# Patient Record
Sex: FEMALE | Race: BLACK OR AFRICAN AMERICAN | Employment: FULL TIME | URBAN - METROPOLITAN AREA
[De-identification: names, ages, dates, MRNs, and addresses within clinical notes are randomized per-mention and may not be internally consistent; named-entity substitution may affect disease eponyms.]

---

## 2018-06-29 ENCOUNTER — HOSPITAL ENCOUNTER (EMERGENCY)
Age: 58
Discharge: HOME | End: 2018-06-29
Attending: EMERGENCY MEDICINE
Payer: SELF-PAY

## 2018-06-29 VITALS
BODY MASS INDEX: 28.4 KG/M2 | WEIGHT: 202.82 LBS | DIASTOLIC BLOOD PRESSURE: 87 MMHG | SYSTOLIC BLOOD PRESSURE: 118 MMHG | RESPIRATION RATE: 16 BRPM | HEIGHT: 71 IN | OXYGEN SATURATION: 100 % | HEART RATE: 80 BPM | TEMPERATURE: 97.4 F

## 2018-06-29 DIAGNOSIS — H10.12 ALLERGIC CONJUNCTIVITIS OF LEFT EYE: ICD-10-CM

## 2018-06-29 DIAGNOSIS — F41.1 ANXIETY STATE: ICD-10-CM

## 2018-06-29 DIAGNOSIS — H57.89 IRRITATION OF EYE: ICD-10-CM

## 2018-06-29 DIAGNOSIS — Z71.6 TOBACCO ABUSE COUNSELING: ICD-10-CM

## 2018-06-29 DIAGNOSIS — S76.919A MUSCLE STRAIN OF THIGH, UNSPECIFIED LATERALITY, INITIAL ENCOUNTER: Primary | ICD-10-CM

## 2018-06-29 PROCEDURE — 99282 EMERGENCY DEPT VISIT SF MDM: CPT

## 2018-06-29 RX ORDER — KETOTIFEN FUMARATE 0.35 MG/ML
1 SOLUTION/ DROPS OPHTHALMIC 2 TIMES DAILY
Qty: 5 ML | Refills: 0 | Status: SHIPPED | OUTPATIENT
Start: 2018-06-29 | End: 2018-07-09

## 2018-06-29 RX ORDER — ERYTHROMYCIN 5 MG/G
OINTMENT OPHTHALMIC
Qty: 1 G | Refills: 0 | Status: SHIPPED | OUTPATIENT
Start: 2018-06-29 | End: 2018-07-06

## 2018-06-29 RX ORDER — METHOCARBAMOL 500 MG/1
500 TABLET, FILM COATED ORAL 4 TIMES DAILY
Qty: 20 TAB | Refills: 0 | Status: SHIPPED | OUTPATIENT
Start: 2018-06-29 | End: 2018-09-10

## 2018-06-29 NOTE — DISCHARGE INSTRUCTIONS
Pinkeye: Care Instructions  Your Care Instructions    Pinkeye is redness and swelling of the eye surface and the conjunctiva (the lining of the eyelid and the covering of the white part of the eye). Pinkeye is also called conjunctivitis. Pinkeye is often caused by infection with bacteria or a virus. Dry air, allergies, smoke, and chemicals are other common causes. Pinkeye often clears on its own in 7 to 10 days. Antibiotics only help if the pinkeye is caused by bacteria. Pinkeye caused by infection spreads easily. If an allergy or chemical is causing pinkeye, it will not go away unless you can avoid whatever is causing it. Follow-up care is a key part of your treatment and safety. Be sure to make and go to all appointments, and call your doctor if you are having problems. It's also a good idea to know your test results and keep a list of the medicines you take. How can you care for yourself at home? · Wash your hands often. Always wash them before and after you treat pinkeye or touch your eyes or face. · Use moist cotton or a clean, wet cloth to remove crust. Wipe from the inside corner of the eye to the outside. Use a clean part of the cloth for each wipe. · Put cold or warm wet cloths on your eye a few times a day if the eye hurts. · Do not wear contact lenses or eye makeup until the pinkeye is gone. Throw away any eye makeup you were using when you got pinkeye. Clean your contacts and storage case. If you wear disposable contacts, use a new pair when your eye has cleared and it is safe to wear contacts again. · If the doctor gave you antibiotic ointment or eyedrops, use them as directed. Use the medicine for as long as instructed, even if your eye starts looking better soon. Keep the bottle tip clean, and do not let it touch the eye area. · To put in eyedrops or ointment:  ¨ Tilt your head back, and pull your lower eyelid down with one finger.   ¨ Drop or squirt the medicine inside the lower lid.  ¨ Close your eye for 30 to 60 seconds to let the drops or ointment move around. ¨ Do not touch the ointment or dropper tip to your eyelashes or any other surface. · Do not share towels, pillows, or washcloths while you have pinkeye. When should you call for help? Call your doctor now or seek immediate medical care if:  ? · You have pain in your eye, not just irritation on the surface. ? · You have a change in vision or loss of vision. ? · You have an increase in discharge from the eye.   ? · Your eye has not started to improve or begins to get worse within 48 hours after you start using antibiotics. ? · Pinkeye lasts longer than 7 days. ? Watch closely for changes in your health, and be sure to contact your doctor if you have any problems. Where can you learn more? Go to http://[employer]ation[employer]beth.info/. Enter Y392 in the search box to learn more about \"Pinkeye: Care Instructions. \"  Current as of: March 20, 2017  Content Version: 11.4  © 4541-8409 [employer]. Care instructions adapted under license by [employer] (which disclaims liability or warranty for this information). If you have questions about a medical condition or this instruction, always ask your healthcare professional. [address] any warranty or liability for your use of this information. Hamstring Strain: Rehab Exercises  Your Care Instructions  Here are some examples of typical rehabilitation exercises for your condition. Start each exercise slowly. Ease off the exercise if you start to have pain. Your doctor or physical therapist will tell you when you can start these exercises and which ones will work best for you. How to do the exercises  Hamstring set (heel dig)    1. Sit with your affected leg bent. Your good leg should be straight and supported on the floor.   2. Tighten the muscles on the back of your bent leg (hamstring) by pressing your heel into the floor.  3. Hold for about 6 seconds, and then rest for up to 10 seconds. 4. Repeat 8 to 12 times. Hamstring curl    1. Lie on your stomach with your knees straight. Place a pillow under your stomach. If your kneecap is uncomfortable, roll up a washcloth and put it under your leg just above your kneecap. 2. Lift the foot of your affected leg by bending your knee so that you bring your foot up toward your buttock. If this motion hurts, try it without bending your knee quite as far. This may help you avoid any painful motion. 3. Slowly move your leg up and down. 4. Repeat 8 to 12 times. 5. When you can do this exercise with ease and no pain, add some resistance. To do this:  6. Tie the ends of an exercise band together to form a loop. Attach one end of the loop to a secure object or shut a door on it to hold it in place. (Or you can have someone hold one end of the loop to provide resistance.)  7. Loop the other end of the exercise band around the lower part of your affected leg. 8. Repeat steps 1 through 4, slowly pulling back on the exercise band with your leg. Hip extension    1. Stand facing a wall with your hands on the wall at about chest level. 2. Keeping the knee of your affected leg straight, kick that leg straight back behind you. 3. Relax, and lower your leg back to the starting position. 4. Repeat 8 to 12 times. 5. When you can do this exercise with ease and no pain, add some resistance. To do this:  6. Tie the ends of an exercise band together to form a loop. Attach one end of the loop to a secure object or shut a door on it to hold it in place. (Or you can have someone hold one end of the loop to provide resistance.)  7. Loop the other end of the exercise band around the lower part of your affected leg. 8. Repeat steps 1 through 4, slowly pulling back on the exercise band with your leg. Hamstring wall stretch    1.  Lie on your back in a doorway, with your good leg through the open door.  2. Slide your affected leg up the wall to straighten your knee. You should feel a gentle stretch down the back of your leg. 1. Do not arch your back. 2. Do not bend either knee. 3. Keep one heel touching the floor and the other heel touching the wall. Do not point your toes. 3. Hold the stretch for at least 1 minute to begin. Then try to lengthen the time you hold the stretch to as long as 6 minutes. 4. Repeat 2 to 4 times. 5. If you do not have a place to do this exercise in a doorway, there is another way to do it:  6. Lie on your back, and bend the knee of your affected leg. 7. Loop a towel under the ball and toes of that foot, and hold the ends of the towel in your hands. 8. Straighten your knee, and slowly pull back on the towel. You should feel a gentle stretch down the back of your leg. 9. Hold the stretch for 15 to 30 seconds. Or even better, hold the stretch for 1 minute if you can. 10. Repeat 2 to 4 times. Calf stretch    1. Stand facing a wall with your hands on the wall at about eye level. Put your affected leg about a step behind your other leg. 2. Keeping your back leg straight and your back heel on the floor, bend your front knee and gently bring your hip and chest toward the wall until you feel a stretch in the calf of your back leg. 3. Hold the stretch for 15 to 30 seconds. 4. Repeat 2 to 4 times. 5. Repeat steps 1 through 4, but this time keep your back knee bent. Single-leg balance    1. Stand on a flat surface with your arms stretched out to your sides like you are making the letter \"T. \" Then lift your good leg off the floor, bending it at the knee. If you are not steady on your feet, use one hand to hold on to a chair, counter, or wall. 2. Standing on your affected leg, keep that knee straight. Try to balance on that leg for up to 30 seconds. Then rest for up to 10 seconds. 3. Repeat 6 to 8 times.   4. When you can balance on your affected leg for 30 seconds with your eyes open, try to balance on it with your eyes closed. 5. When you can do this exercise with your eyes closed for 30 seconds and with ease and no pain, try standing on a pillow or piece of foam, and repeat steps 1 through 4. Follow-up care is a key part of your treatment and safety. Be sure to make and go to all appointments, and call your doctor if you are having problems. It's also a good idea to know your test results and keep a list of the medicines you take. Where can you learn more? Go to http://feliberto-lizbeth.info/. Enter [id number] in the search box to learn more about \"Hamstring Strain: Rehab Exercises. \"  Current as of: March 21, 2017  Content Version: 11.4  © 7632-5988 [employer]. Care instructions adapted under license by [employer] (which disclaims liability or warranty for this information). If you have questions about a medical condition or this instruction, always ask your healthcare professional. [address] any warranty or liability for your use of this information. Hamstring Strain: Care Instructions  Your Care Instructions    A hamstring strain happens when you overstretch, or pull, the muscles that run down the back of your thigh. It can happen when you exercise or lift something or if you're injured in an accident. You may feel pain and tenderness that's worse when you move your injured leg. The back of your thigh may be swollen and bruised. If you have a bad strain, you may not be able to move your leg normally. While a minor strain often heals well with rest and other treatment, a severe strain may require medical treatment. If a severe strain isn't treated, you may have long-lasting problems. Follow-up care is a key part of your treatment and safety. Be sure to make and go to all appointments, and call your doctor if you are having problems.  It's also a good idea to know your test results and keep a list of the medicines you take. How can you care for yourself at home? · Rest your injured leg. Don't put weight on it for a day or two. If your doctor advises you to, use crutches to rest the leg. · Put ice or a cold pack on the back of your thigh for 10 to 20 minutes at a time to stop swelling. Put a thin cloth between the ice and your skin. · Wrapping your thigh with an elastic bandage (such as an Ace wrap), will help decrease swelling. Don't wrap it too tightly, since this can cause more swelling below the affected area. · Elevate your thigh on pillows while applying ice and anytime you are sitting or lying down. · Ask your doctor if you can take an over-the-counter pain medicine, such as acetaminophen (Tylenol), ibuprofen (Advil, Motrin), or naproxen (Aleve). Be safe with medicines. Read and follow all instructions on the label. · Don't do anything that makes the pain worse. Return to your usual level of activity slowly. When should you call for help? Call your doctor now or seek immediate medical care if:  ? · You have severe or increasing pain. ? · You have tingling, weakness, or numbness in your injured leg. ? · You cannot move your injured leg. ? Watch closely for changes in your health, and be sure to contact your doctor if:  ? · You do not get better as expected. Where can you learn more? Go to http://feliberto-lizbeth.info/. Enter H072 in the search box to learn more about \"Hamstring Strain: Care Instructions. \"  Current as of: March 21, 2017  Content Version: 11.4  © 7109-2084 [employer]. Care instructions adapted under license by [employer] (which disclaims liability or warranty for this information). If you have questions about a medical condition or this instruction, always ask your healthcare professional. [address] any warranty or liability for your use of this information.

## 2018-06-29 NOTE — ED PROVIDER NOTES
EMERGENCY DEPARTMENT HISTORY AND PHYSICAL EXAM      Date: 6/29/2018  Patient Name: Anitra Gallegos    PROVIDER IN TRIAGE NOTE:  1:00 PM  Luis Maki PA-C has evaluated the patient as the Provider in Triage (PIT) for eye pain and bilateral leg/groin pain. The vital signs and the triage nurse assessment have been reviewed. The patient and any available family have been advised that the appropriate studies have been ordered to initiate the work up based on the clinical presentation during the assessment. The pt has been advised that they will be accommodated in express care as soon as possible. The pt has been requested to contact the triage nurse or PIT immediately if they experiences any changes in their condition during this brief waiting period. History of Presenting Illness     Chief Complaint   Patient presents with    Groin Pain     pt reports bilateral thigh / groin pain, pt thinks she may have pulled something at work   [facility] Pain     for couple weeks- swelling discharge       History Provided By: Patient    HPI: Anitra Gallegos, 59 y.o. female with PMHx significant for anxiety and tobacco abuse, presents ambulatory to the ED with cc of groin pain x 1 week. Pt states she thinks she pulled it at work. She works at [employer] and she performs heavy lifting regularly. Pt states that her pain is worse at night when she is trying to rest. She describes her pain as aching. She has tried taking tylenol with moderate relief in her symptoms. She last took tylenol two yesterday morning. Pt reports she has been ambulatory since pain onset. Of note, she also reports L eye swelling with \"white stuff in the corner. \" Pt has been using Visine with intermittent relief. Pt went to [facility] ED and was written for drops but she has not yet filled it. Deneis any recent injury or falls, injuries. There are no other complaints, changes, or physical findings at this time.     PCP: Shekhar Felix MD    Current Outpatient Prescriptions   Medication Sig Dispense Refill    methocarbamol (ROBAXIN) 500 mg tablet Take 1 Tab by mouth four (4) times daily. 20 Tab 0    erythromycin (ILOTYCIN) ophthalmic ointment Apply three times daily to affected eye 1 g 0    ketotifen (ZADITOR) 0.025 % (0.035 %) ophthalmic solution Administer 1 Drop to both eyes two (2) times a day for 10 days. 5 mL 0       Past History     Past Medical History:  No past medical history on file. Past Surgical History:  No past surgical history on file. Family History:  No family history on file. Social History:  Social History   Substance Use Topics    Smoking status: Current Every Day Smoker     Packs/day: 1.00     Types: Cigarettes    Smokeless tobacco: Never Used    Alcohol use No       Allergies:  No Known Allergies      Review of Systems   Review of Systems   Constitutional: Negative. Negative for activity change, appetite change, chills and fever. HENT: Negative for rhinorrhea and sore throat. Eyes: Positive for discharge and itching. Negative for photophobia, pain, redness and visual disturbance. Respiratory: Negative for cough, shortness of breath and wheezing. Cardiovascular: Negative for chest pain, palpitations and leg swelling. Gastrointestinal: Negative for abdominal pain, diarrhea, nausea and vomiting. Genitourinary: Negative for dysuria and hematuria. Musculoskeletal: Positive for myalgias. Negative for arthralgias. Skin: Negative for color change, rash and wound. Neurological: Negative for dizziness and headaches. All other systems reviewed and are negative. Physical Exam   Physical Exam   Constitutional: She is oriented to person, place, and time. Vital signs are normal. She appears well-developed and well-nourished. No distress. 59 y.o.  female in NAD  Communicates appropriately and in full sentences   HENT:   Head: Normocephalic and atraumatic.    Mouth/Throat: No oropharyngeal exudate. Eyes: Conjunctivae are normal. Pupils are equal, round, and reactive to light. Right eye exhibits no discharge. Left eye exhibits no discharge. No conjunctival erythema or irritation  Cobblestoning to lower L eyelid   Neck: Normal range of motion. Neck supple. No nuchal rigidity or meningeal signs   Pulmonary/Chest: Effort normal. No respiratory distress. Musculoskeletal: Normal range of motion. She exhibits no edema, tenderness or deformity. No neurologic, motor, vascular, or compartment embarrassment observed on exam. No focal neurologic deficits. Mild tenderness to quadriceps with no overlying skin changes  NVi distally  Ambulatory without assistance   Neurological: She is alert and oriented to person, place, and time. Skin: Skin is warm and dry. No rash noted. She is not diaphoretic. No erythema. No pallor. Psychiatric: She has a normal mood and affect. Her behavior is normal.   Nursing note and vitals reviewed. Diagnostic Study Results     No formal testing initiated    Medical Decision Making   I am the first provider for this patient. I reviewed the vital signs, available nursing notes, past medical history, past surgical history, family history and social history. Vital Signs-Reviewed the patient's vital signs. Patient Vitals for the past 12 hrs:   Temp Pulse Resp BP SpO2   06/29/18 1259 97.4 °F (36.3 °C) 80 16 118/87 100 %       Records Reviewed: Nursing Notes and Old Medical Records    Provider Notes (Medical Decision Making):   DDx: strain, sprain, contusion, spasm; conjunctivitis, viral illness    No vision changes. Eye does not appear to be acutely infected. Pt insistent on receiving ABX for her eye. ED Course:   Initial assessment performed. The patients presenting problems have been discussed, and they are in agreement with the care plan formulated and outlined with them.   I have encouraged them to ask questions as they arise throughout their visit.    TOBACCO COUNSELING:  Upon evaluation, pt expressed that they are a current tobacco user. Pt has been counseled on the dangers of smoking and was encouraged to quit as soon as possible in order to decrease further risks to their health. Pt has conveyed their understanding of the risks involved should they continue to use tobacco products. PLAN:  1. Discharge Medication List as of 6/29/2018  1:54 PM      START taking these medications    Details   methocarbamol (ROBAXIN) 500 mg tablet Take 1 Tab by mouth four (4) times daily. , Normal, Disp-20 Tab, R-0      erythromycin (ILOTYCIN) ophthalmic ointment Apply three times daily to affected eye, Normal, Disp-1 g, R-0      ketotifen (ZADITOR) 0.025 % (0.035 %) ophthalmic solution Administer 1 Drop to both eyes two (2) times a day for 10 days. , Normal, Disp-5 mL, R-0           2. Follow-up Information     Follow up With Details Comments Contact Maliha Lynn MD Schedule an appointment as soon as possible for a visit in 2 days As needed, If symptoms worsen, Possible further evaluation and treatment [address] [place] [address]  [phone]      [facility] EMERGENCY DEPT Go to As needed, If symptoms worsen [address] [facility] [address] Tessa Hansen MD Call today  [address] [facility]  [address]  [address] [phone]          Return to ED if worse     Diagnosis     Clinical Impression:   1. Muscle strain of thigh, unspecified laterality, initial encounter    2. Irritation of eye    3. Allergic conjunctivitis of left eye    4. Anxiety state      This note will not be viewable in MyChart.

## 2018-06-29 NOTE — ED NOTES
Assumed care of patient. Patient is alert and oriented and is ambulatory or ambulatory with minimal assistance. Patient is evaluated by mid-level provider in the JET express procedure of the Emergency Department. The Patient is made aware this nurse is available for any assistance at any point of the JET express process. Family and/or caregiver is encouraged to be present. Focus Note: Patient c/o left eye swelling and discharge x 2 weeks. Patient c/o bilateral pain secondary to heavy lifting (per patient)      Physical Assessment:    Neuro:  WDL  Cardiac: WDL  Resp: WDL  PVS: WDL  GI/: WDL  Skin: WDL  ENT: left eye swelling and drainage  Musculoskeletal: groin pain

## 2018-08-02 ENCOUNTER — HOSPITAL ENCOUNTER (EMERGENCY)
Age: 58
Discharge: HOME | End: 2018-08-02
Attending: EMERGENCY MEDICINE
Payer: SELF-PAY

## 2018-08-02 VITALS
OXYGEN SATURATION: 99 % | DIASTOLIC BLOOD PRESSURE: 64 MMHG | WEIGHT: 200.84 LBS | HEIGHT: 71 IN | SYSTOLIC BLOOD PRESSURE: 103 MMHG | BODY MASS INDEX: 28.12 KG/M2 | RESPIRATION RATE: 16 BRPM | HEART RATE: 83 BPM | TEMPERATURE: 98.1 F

## 2018-08-02 DIAGNOSIS — M25.512 ACUTE PAIN OF LEFT SHOULDER: Primary | ICD-10-CM

## 2018-08-02 DIAGNOSIS — H57.89 IRRITATION OF LEFT EYE: ICD-10-CM

## 2018-08-02 PROCEDURE — 99283 EMERGENCY DEPT VISIT LOW MDM: CPT

## 2018-08-02 RX ORDER — IBUPROFEN 800 MG/1
800 TABLET ORAL
Qty: 20 TAB | Refills: 0 | Status: SHIPPED | OUTPATIENT
Start: 2018-08-02 | End: 2018-08-09

## 2018-08-02 RX ORDER — KETOTIFEN FUMARATE 0.35 MG/ML
1 SOLUTION/ DROPS OPHTHALMIC 2 TIMES DAILY
Qty: 1 BOTTLE | Refills: 0 | Status: SHIPPED | OUTPATIENT
Start: 2018-08-02 | End: 2018-08-12

## 2018-08-02 NOTE — ED NOTES
Patient identified, read over and explained discharge instructions with time for questions and answers by attending MD/PA.  Patient has verbalized understanding of discharge instructions. Ambulatory to go home with written paperwork in hand.

## 2018-08-02 NOTE — ED PROVIDER NOTES
EMERGENCY DEPARTMENT HISTORY AND PHYSICAL EXAM 
 
 
Date: 8/2/2018 Patient Name: Wilfrid Gold History of Presenting Illness Chief Complaint Patient presents with  Eye Pain  
  left eye pain x 5 months; pt reports \"she was seen for this before and it still is hurting\"  Shoulder Pain  
  left shoulder pain that has been \"hurting for years with LROM History Provided By: Patient HPI: Wilfrid Gold, 59 y.o. female presents ambulatory to the ED with cc of  
1) many months of 5 out of 10 constant aching left shoulder pain that is worse with overhead movement. She has had this problem for years and would get steroid injections years ago. 2) many weeks of left eye irritation. Has been seen previously for this problem. She does not wear contact lenses. She tells me \"the other lady's tryin' to tell me it's allergies but I know it's the mold at work. Can you just give me a note to say it's the mold at work\". 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 No vision changes. No headache. No eye pain. Chief Complaint: left shoulder pain Duration: many Months Timing:  Constant Location: left shoulder Quality: Aching Severity: 5 out of 10 Modifying Factors: worse with overhead activity Associated Symptoms: denies any other associated signs or symptoms There are no other complaints, changes, or physical findings at this time. PCP: Zina Abraham MD 
 
Current Outpatient Prescriptions Medication Sig Dispense Refill  ibuprofen (MOTRIN) 800 mg tablet Take 1 Tab by mouth every eight (8) hours as needed for Pain for up to 7 days. 20 Tab 0  
 ketotifen (ZADITOR) 0.025 % (0.035 %) ophthalmic solution Administer 1 Drop to both eyes two (2) times a day for 10 days. 1 Bottle 0  
 methocarbamol (ROBAXIN) 500 mg tablet Take 1 Tab by mouth four (4) times daily.  20 Tab 0  
 
 
Past History Past Medical History: No past medical history on file. Past Surgical History: No past surgical history on file. Family History: No family history on file. Social History: 
Social History Substance Use Topics  Smoking status: Current Every Day Smoker Packs/day: 1.00 Types: Cigarettes  Smokeless tobacco: Never Used  Alcohol use No  
 
 
Allergies: 
No Known Allergies Review of Systems Review of Systems Constitutional: Negative for fatigue and fever. HENT: Negative for ear pain and sore throat. Eyes: Positive for pain. Negative for redness and visual disturbance. Respiratory: Negative for cough and shortness of breath. Cardiovascular: Negative for chest pain and palpitations. Gastrointestinal: Negative for abdominal pain, nausea and vomiting. Genitourinary: Negative for dysuria, frequency and urgency. Musculoskeletal: Negative for back pain, gait problem, neck pain and neck stiffness. Left shoulder pain Skin: Negative for rash and wound. Neurological: Negative for dizziness, weakness, light-headedness, numbness and headaches. Physical Exam  
Physical Exam  
Constitutional: She is oriented to person, place, and time. She appears well-developed and well-nourished. Non-toxic appearance. No distress. HENT:  
Head: Normocephalic and atraumatic. Right Ear: External ear normal.  
Left Ear: External ear normal.  
Nose: Nose normal.  
Mouth/Throat: Uvula is midline. No trismus in the jaw. Eyes: Conjunctivae and EOM are normal. Pupils are equal, round, and reactive to light. No scleral icterus. LEFT EYE: 
No periorbital edema or erythema No conjunctival injection Sclera are white PERRL 
EOMI Neck: Normal range of motion and full passive range of motion without pain. Cardiovascular: Normal rate and regular rhythm. Pulmonary/Chest: Effort normal. No accessory muscle usage. No tachypnea. No respiratory distress.  She has no decreased breath sounds. She has no wheezes. Abdominal: Soft. There is no tenderness. Musculoskeletal: Normal range of motion. Left shoulder: She exhibits tenderness. LEFT SHOULDER: 
Good symmetry No bruising, redness or swelling Essentially FAROM with a painful arc Diffuse tenderness Neurological: She is alert and oriented to person, place, and time. She is not disoriented. No cranial nerve deficit. GCS eye subscore is 4. GCS verbal subscore is 5. GCS motor subscore is 6. Skin: Skin is intact. No rash noted. Psychiatric: She has a normal mood and affect. Her speech is normal.  
Nursing note and vitals reviewed. Diagnostic Study Results Labs - No results found for this or any previous visit (from the past 12 hour(s)). Radiologic Studies - No orders to display CT Results  (Last 48 hours) None CXR Results  (Last 48 hours) None Medical Decision Making I am the first provider for this patient. I reviewed the vital signs, available nursing notes, past medical history, past surgical history, family history and social history. Vital Signs-Reviewed the patient's vital signs. Patient Vitals for the past 12 hrs: 
 Temp Pulse Resp BP SpO2  
08/02/18 0925 98.1 °F (36.7 °C) 83 16 103/64 99 % Records Reviewed: Nursing Notes and Old Medical Records Provider Notes (Medical Decision Making): DDx includes subacromial bursitis, ACJ strain, impingement syndrome, rotator cuff strain/tear, arthritis and other causes of mechanical shoulder pain. --given chronicity and the absence of injury, will defer imaging Regarding her eye, this appears to be a chronic problem without vision loss or worrisome symptoms. Will refer to Ophthalmology ED Course:  
Initial assessment performed. The patients presenting problems have been discussed, and they are in agreement with the care plan formulated and outlined with them.   I have encouraged them to ask questions as they arise throughout their visit. Disposition: 
Discharge PLAN: 
1. Current Discharge Medication List  
  
START taking these medications Details  
ibuprofen (MOTRIN) 800 mg tablet Take 1 Tab by mouth every eight (8) hours as needed for Pain for up to 7 days. Qty: 20 Tab, Refills: 0  
  
ketotifen (ZADITOR) 0.025 % (0.035 %) ophthalmic solution Administer 1 Drop to both eyes two (2) times a day for 10 days. Qty: 1 Bottle, Refills: 0 CONTINUE these medications which have NOT CHANGED Details  
methocarbamol (ROBAXIN) 500 mg tablet Take 1 Tab by mouth four (4) times daily. Qty: 20 Tab, Refills: 0  
  
  
 
2. Follow-up Information Follow up With Details Comments Contact Info Haylee Mistry MD Schedule an appointment as soon as possible for a visit PRIMARY CARE: call to schedule follow up [address] 
[phone] Hany Aguilar MD Schedule an appointment as soon as possible for a visit ORTHO: regarding your chronic left shoulder pain [address] 
[phone] [facility] Schedule an appointment as soon as possible for a visit EYE DOCTOR: regarding your chronic left eye irritation [id number] [id number] [phone] Return to ED if worse Diagnosis Clinical Impression: 1. Acute pain of left shoulder 2. Irritation of left eye

## 2018-08-02 NOTE — LETTER
[address] 
\[facility]\"" EMERGENCY DEPT 
[address] [address] [place]im [place]. [address] 
[phone] Work/School Note Date: 8/2/2018 To Whom It May concern: 
 
Varun Grove was seen and treated today in the emergency room by the following provider(s): 
Attending Provider: Alonzo Leone MD 
Physician Assistant: THUY Miller. Trevor Mancilla : AVOID OVERHEAD LIFTING FOR A PERIOD OF 7 DAYS Sincerely, THUY Miller

## 2018-08-02 NOTE — DISCHARGE INSTRUCTIONS
[facility] SYSTEMS Departments     For adult and child immunizations, family planning, TB screening, STD testing and women's health services. [facility]: [place] [phone]      [facility] D 25   [address]   [address]   170 [facility]: [place] [address] [facility] [address] [phone]      [address]          [address] [address]     For primary care services, woman and child wellness, and some clinics providing specialty care. [facility] -- 1011 [facility] 2525 [facility] [phone]   411 [facility] 200 [facility] 3614 [facility] [phone]   339 [facility] [address] [phone]   [address]  [employer] [address] [place] [address] [address] [facility]  [phone]   [address] [address] [address] [place] [phone]   [facility] 81 [facility] [phone]   [facility] 1051 [facility] [phone]   [facility]: [facility] [address], [address] [place] [address], [address] [phone]     [facility] [address] [phone]   [facility] 5850 [facility]  [phone]   [employer]  [address], [address] ([email or web address]) [phone]         Sexual Health/Woman Wellness Clinics    For STD/HIV testing and treatment, pregnancy testing and services, men's health, birth control services, LGBT services, and hepatitis/HPV vaccine services. [place] [facility] [place] for [employer] [address] 75 [facility] 1579 [address] Juan Mai [phone]   [facility] [address], 5th floor [phone]   Pregnancy [address] [facility] [address] [phone]         Specialty Service 7357 [facility]   [phone]   [place]   [phone]   Women, Infant and Children's Services: [address] [phone]       600 [facility]   [phone]   [address]   [facility]     [phone]   [address] Crisis   [address] [phone]     Local Primary Care Physicians  [facility] [phone]  MD Nolberto Borrego MD Iantha Ganja, MD [facility] [phone]  Trung Marin, FNP  Toan Carlson, MD Bossman Baldwin MD [address] [address] [phone]  Humberto Alfonso, MD [place]atsheva [place] [address] [facility] [phone]  Martha Mclean, MD Johann Almonte, MD Nidhi Mccall MD   [facility] [phone]  [facility] BYZFCZ KG, MD Greer Cadena, MD Cristian Turner, NP [address] [facility] [address] [phone]  Henok Carson, MD Thu Mann, MD Bee Marquez, MD Ximena Odom, MD Nuris Rubio MD   33 57 [facility]  Abbey Dangelo MD [address] [phone]  David Ch, MD Tracy Perez, NP  MD Miller Carranza, MD Artelia Bamberger, MD Jearl Fitz, MD Abby Dally, MD   6258 [facility] [phone]  Heather Varma, MD Jamee Watts, P  Jackelyn Cohen, INDRA Etienne, MD Rao Hoskins, MD Lavonne Klein, MD ALESHA Dorsey [facility] [phone]  MD Bharathi Morataya, MD Johny Mcgee MD Boni Even, MD   [address] [phone]  MD Letty Frederick MD [place] [phone]  MD Fish Lawler MD Donne Dago Jane [facility], [address] [facility] [phone]  Laquita Jordan, MD Jim Feliz, MD Barber Florez, MD Joey Briseno, MD Gregory Beltre, MD Joss Acevedo, NP  Tariq Estrada MD [address]  66   [phone]  Leesa Hamilton, MD Teresa Deng, MD Cao [facility], MD   2102 [facility] [phone]  Narciso Noyola, MD Kelsea Mckenzie, SANIYAP  Rachel Soni, PAMURIEL Soni, FNP  VELASQUEZ Pritchard, MD Jovanna Chase, INDRA Mari, DO Miscellaneous:  [address] [place] [phone]

## 2018-09-10 ENCOUNTER — APPOINTMENT (OUTPATIENT)
Dept: CT IMAGING | Age: 58
End: 2018-09-10
Attending: EMERGENCY MEDICINE
Payer: SELF-PAY

## 2018-09-10 ENCOUNTER — APPOINTMENT (OUTPATIENT)
Dept: GENERAL RADIOLOGY | Age: 58
End: 2018-09-10
Attending: EMERGENCY MEDICINE
Payer: SELF-PAY

## 2018-09-10 ENCOUNTER — HOSPITAL ENCOUNTER (EMERGENCY)
Age: 58
Discharge: HOME | End: 2018-09-10
Attending: EMERGENCY MEDICINE
Payer: SELF-PAY

## 2018-09-10 VITALS
WEIGHT: 195.99 LBS | BODY MASS INDEX: 27.44 KG/M2 | TEMPERATURE: 98.1 F | OXYGEN SATURATION: 96 % | DIASTOLIC BLOOD PRESSURE: 85 MMHG | HEIGHT: 71 IN | SYSTOLIC BLOOD PRESSURE: 113 MMHG | RESPIRATION RATE: 16 BRPM | HEART RATE: 83 BPM

## 2018-09-10 DIAGNOSIS — N12 PYELONEPHRITIS: Primary | ICD-10-CM

## 2018-09-10 LAB
ALBUMIN SERPL-MCNC: 3 G/DL (ref 3.5–5)
ALBUMIN/GLOB SERPL: 0.6 {RATIO} (ref 1.1–2.2)
ALP SERPL-CCNC: 68 U/L (ref 45–117)
ALT SERPL-CCNC: 18 U/L (ref 12–78)
ANION GAP SERPL CALC-SCNC: 4 MMOL/L (ref 5–15)
APPEARANCE UR: ABNORMAL
AST SERPL-CCNC: 23 U/L (ref 15–37)
ATRIAL RATE: 72 BPM
BACTERIA URNS QL MICRO: ABNORMAL /HPF
BASOPHILS # BLD: 0.1 K/UL (ref 0–0.1)
BASOPHILS NFR BLD: 1 % (ref 0–1)
BILIRUB SERPL-MCNC: 0.4 MG/DL (ref 0.2–1)
BILIRUB UR QL: NEGATIVE
BUN SERPL-MCNC: 9 MG/DL (ref 6–20)
BUN/CREAT SERPL: 9 (ref 12–20)
CALCIUM SERPL-MCNC: 9 MG/DL (ref 8.5–10.1)
CALCULATED P AXIS, ECG09: 81 DEGREES
CALCULATED R AXIS, ECG10: 91 DEGREES
CALCULATED T AXIS, ECG11: 36 DEGREES
CHLORIDE SERPL-SCNC: 106 MMOL/L (ref 97–108)
CO2 SERPL-SCNC: 29 MMOL/L (ref 21–32)
COLOR UR: ABNORMAL
CREAT SERPL-MCNC: 0.98 MG/DL (ref 0.55–1.02)
DIAGNOSIS, 93000: NORMAL
DIFFERENTIAL METHOD BLD: NORMAL
EOSINOPHIL # BLD: 0.2 K/UL (ref 0–0.4)
EOSINOPHIL NFR BLD: 2 % (ref 0–7)
EPITH CASTS URNS QL MICRO: ABNORMAL /LPF
ERYTHROCYTE [DISTWIDTH] IN BLOOD BY AUTOMATED COUNT: 13.5 % (ref 11.5–14.5)
GLOBULIN SER CALC-MCNC: 5.2 G/DL (ref 2–4)
GLUCOSE SERPL-MCNC: 83 MG/DL (ref 65–100)
GLUCOSE UR STRIP.AUTO-MCNC: NEGATIVE MG/DL
HCT VFR BLD AUTO: 43.4 % (ref 35–47)
HGB BLD-MCNC: 14.3 G/DL (ref 11.5–16)
HGB UR QL STRIP: ABNORMAL
HYALINE CASTS URNS QL MICRO: ABNORMAL /LPF (ref 0–5)
IMM GRANULOCYTES # BLD: 0 K/UL (ref 0–0.04)
IMM GRANULOCYTES NFR BLD AUTO: 0 % (ref 0–0.5)
KETONES UR QL STRIP.AUTO: NEGATIVE MG/DL
LEUKOCYTE ESTERASE UR QL STRIP.AUTO: ABNORMAL
LIPASE SERPL-CCNC: 98 U/L (ref 73–393)
LYMPHOCYTES # BLD: 1.8 K/UL (ref 0.8–3.5)
LYMPHOCYTES NFR BLD: 23 % (ref 12–49)
MCH RBC QN AUTO: 30.8 PG (ref 26–34)
MCHC RBC AUTO-ENTMCNC: 32.9 G/DL (ref 30–36.5)
MCV RBC AUTO: 93.3 FL (ref 80–99)
MONOCYTES # BLD: 0.6 K/UL (ref 0–1)
MONOCYTES NFR BLD: 8 % (ref 5–13)
NEUTS SEG # BLD: 5.3 K/UL (ref 1.8–8)
NEUTS SEG NFR BLD: 67 % (ref 32–75)
NITRITE UR QL STRIP.AUTO: NEGATIVE
NRBC # BLD: 0 K/UL (ref 0–0.01)
NRBC BLD-RTO: 0 PER 100 WBC
P-R INTERVAL, ECG05: 132 MS
PH UR STRIP: 6.5 [PH] (ref 5–8)
PLATELET # BLD AUTO: 176 K/UL (ref 150–400)
PMV BLD AUTO: 10.6 FL (ref 8.9–12.9)
POTASSIUM SERPL-SCNC: 4.5 MMOL/L (ref 3.5–5.1)
PROT SERPL-MCNC: 8.2 G/DL (ref 6.4–8.2)
PROT UR STRIP-MCNC: NEGATIVE MG/DL
Q-T INTERVAL, ECG07: 382 MS
QRS DURATION, ECG06: 94 MS
QTC CALCULATION (BEZET), ECG08: 418 MS
RBC # BLD AUTO: 4.65 M/UL (ref 3.8–5.2)
RBC #/AREA URNS HPF: ABNORMAL /HPF (ref 0–5)
SODIUM SERPL-SCNC: 139 MMOL/L (ref 136–145)
SP GR UR REFRACTOMETRY: 1.01 (ref 1–1.03)
TROPONIN I SERPL-MCNC: <0.05 NG/ML
UA: UC IF INDICATED,UAUC: ABNORMAL
UROBILINOGEN UR QL STRIP.AUTO: 1 EU/DL (ref 0.2–1)
VENTRICULAR RATE, ECG03: 72 BPM
WBC # BLD AUTO: 8 K/UL (ref 3.6–11)
WBC URNS QL MICRO: >100 /HPF (ref 0–4)

## 2018-09-10 PROCEDURE — 36415 COLL VENOUS BLD VENIPUNCTURE: CPT | Performed by: EMERGENCY MEDICINE

## 2018-09-10 PROCEDURE — 85025 COMPLETE CBC W/AUTO DIFF WBC: CPT | Performed by: EMERGENCY MEDICINE

## 2018-09-10 PROCEDURE — 87086 URINE CULTURE/COLONY COUNT: CPT | Performed by: EMERGENCY MEDICINE

## 2018-09-10 PROCEDURE — 74011250636 HC RX REV CODE- 250/636: Performed by: EMERGENCY MEDICINE

## 2018-09-10 PROCEDURE — 99285 EMERGENCY DEPT VISIT HI MDM: CPT

## 2018-09-10 PROCEDURE — 71046 X-RAY EXAM CHEST 2 VIEWS: CPT

## 2018-09-10 PROCEDURE — 80053 COMPREHEN METABOLIC PANEL: CPT | Performed by: EMERGENCY MEDICINE

## 2018-09-10 PROCEDURE — 87186 SC STD MICRODIL/AGAR DIL: CPT | Performed by: EMERGENCY MEDICINE

## 2018-09-10 PROCEDURE — 87077 CULTURE AEROBIC IDENTIFY: CPT | Performed by: EMERGENCY MEDICINE

## 2018-09-10 PROCEDURE — 81001 URINALYSIS AUTO W/SCOPE: CPT | Performed by: EMERGENCY MEDICINE

## 2018-09-10 PROCEDURE — 74176 CT ABD & PELVIS W/O CONTRAST: CPT

## 2018-09-10 PROCEDURE — 93005 ELECTROCARDIOGRAM TRACING: CPT

## 2018-09-10 PROCEDURE — 84484 ASSAY OF TROPONIN QUANT: CPT | Performed by: EMERGENCY MEDICINE

## 2018-09-10 PROCEDURE — 96372 THER/PROPH/DIAG INJ SC/IM: CPT

## 2018-09-10 PROCEDURE — 83690 ASSAY OF LIPASE: CPT | Performed by: EMERGENCY MEDICINE

## 2018-09-10 RX ORDER — SODIUM CHLORIDE 0.9 % (FLUSH) 0.9 %
5-10 SYRINGE (ML) INJECTION AS NEEDED
Status: DISCONTINUED | OUTPATIENT
Start: 2018-09-10 | End: 2018-09-10 | Stop reason: HOSPADM

## 2018-09-10 RX ORDER — SODIUM CHLORIDE 0.9 % (FLUSH) 0.9 %
5-10 SYRINGE (ML) INJECTION EVERY 8 HOURS
Status: DISCONTINUED | OUTPATIENT
Start: 2018-09-10 | End: 2018-09-10 | Stop reason: HOSPADM

## 2018-09-10 RX ORDER — HYDROCODONE BITARTRATE AND ACETAMINOPHEN 5; 325 MG/1; MG/1
1 TABLET ORAL
Qty: 10 TAB | Refills: 0 | Status: SHIPPED | OUTPATIENT
Start: 2018-09-10

## 2018-09-10 RX ORDER — CEFUROXIME AXETIL 500 MG/1
500 TABLET ORAL 2 TIMES DAILY
Qty: 20 TAB | Refills: 0 | Status: SHIPPED | OUTPATIENT
Start: 2018-09-10 | End: 2018-09-20

## 2018-09-10 RX ORDER — PROMETHAZINE HYDROCHLORIDE 25 MG/1
25 TABLET ORAL
Qty: 12 TAB | Refills: 0 | Status: SHIPPED | OUTPATIENT
Start: 2018-09-10

## 2018-09-10 RX ADMIN — LIDOCAINE HYDROCHLORIDE 1 G: 10 INJECTION, SOLUTION EPIDURAL; INFILTRATION; INTRACAUDAL; PERINEURAL at 13:22

## 2018-09-10 NOTE — DISCHARGE INSTRUCTIONS
Kidney Infection: Care Instructions  Your Care Instructions    A kidney infection (pyelonephritis) is a type of urinary tract infection, or UTI. Most UTIs are bladder infections. Kidney infections tend to make people much sicker than bladder infections do. A kidney infection is also more serious because it can cause lasting damage if it is not treated quickly. Follow-up care is a key part of your treatment and safety. Be sure to make and go to all appointments, and call your doctor if you are having problems. It's also a good idea to know your test results and keep a list of the medicines you take. How can you care for yourself at home? · Take your antibiotics as directed. Do not stop taking them just because you feel better. You need to take the full course of antibiotics. · Drink plenty of water, enough so that your urine is light yellow or clear like water. This may help wash out bacteria that are causing the infection. If you have kidney, heart, or liver disease and have to limit fluids, talk with your doctor before you increase the amount of fluids you drink. · Urinate often. Try to empty your bladder each time. · To relieve pain, take a hot shower or lay a heating pad (set on low) over your lower belly. Never go to sleep with a heating pad in place. Put a thin cloth between the heating pad and your skin. To help prevent kidney infections  · Drink plenty of water each day. This helps you urinate often, which clears bacteria from your system. If you have kidney, heart, or liver disease and have to limit fluids, talk with your doctor before you increase the amount of fluids you drink. · Urinate when you have the urge. Do not hold your urine for a long time. Urinate before you go to sleep. · If you have symptoms of a bladder infection, such as burning when you urinate or having to urinate often, call your doctor so you can treat the problem before it gets worse.  If you do not treat a bladder infection quickly, it can spread to the kidney. · Men should keep the tip of the penis clean. If you are a woman, keep these ideas in mind:  · Urinate right after you have sex. · Change sanitary pads often. Avoid douches, feminine hygiene sprays, and other feminine hygiene products that have deodorants. · After going to the bathroom, wipe from front to back. When should you call for help? Call your doctor now or seek immediate medical care if:    · You have symptoms that a kidney infection is getting worse. These may include:  ¨ Pain or burning when you urinate. ¨ A frequent need to urinate without being able to pass much urine. ¨ Pain in the flank, which is just below the rib cage and above the waist on either side of the back. ¨ Blood in the urine. ¨ A fever.     · You are vomiting or nauseated.    Watch closely for changes in your health, and be sure to contact your doctor if:    · You do not get better as expected. Where can you learn more? Go to http://feliberto-lizbeth.info/. Enter Q560 in the search box to learn more about \"Kidney Infection: Care Instructions. \"  Current as of: May 12, 2017  Content Version: 11.7  © 0262-3759 [employer]. Care instructions adapted under license by [employer] (which disclaims liability or warranty for this information). If you have questions about a medical condition or this instruction, always ask your healthcare professional. [address] [place] [address] any warranty or liability for your use of this information. [employer] Activation    Thank you for requesting access to [employer]. Please follow the instructions below to securely access and download your online medical record. [employer] allows you to send messages to your doctor, view your test results, renew your prescriptions, schedule appointments, and more. How Do I Sign Up? 1. In your internet browser, go to [email or web address]  2.  Click on the First Time User? Click Here link in the Sign In box. You will be redirect to the New Member Sign Up page. 3. Enter your [employer] Access Code exactly as it appears below. You will not need to use this code after youve completed the sign-up process. If you do not sign up before the expiration date, you must request a new code. [employer] Access Code: [email or web address]  Expires: 2018  1:00 PM (This is the date your [employer] access code will )    4. Enter the last four digits of your Social Security Number (xxxx) and Date of Birth (mm/dd/yyyy) as indicated and click Submit. You will be taken to the next sign-up page. 5. Create a [employer] ID. This will be your [employer] login ID and cannot be changed, so think of one that is secure and easy to remember. 6. Create a [employer] password. You can change your password at any time. 7. Enter your Password Reset Question and Answer. This can be used at a later time if you forget your password. 8. Enter your e-mail address. You will receive e-mail notification when new information is available in [address] 9. Click Sign Up. You can now view and download portions of your medical record. 10. Click the Download Summary menu link to download a portable copy of your medical information. Additional Information    If you have questions, please visit the Frequently Asked Questions section of the [employer] website at [email or web address] Remember, [employer] is NOT to be used for urgent needs. For medical emergencies, dial 911.

## 2018-09-10 NOTE — ED NOTES
Bedside shift change report given to Taurus Coleman RN (oncoming nurse) by Geovanna Rosenthal RN (offgoing nurse). Report included the following information SBAR, ED Summary, MAR and Recent Results.

## 2018-09-10 NOTE — ED PROVIDER NOTES
EMERGENCY DEPARTMENT HISTORY AND PHYSICAL EXAM 
 
 
Date: 9/10/2018 Patient Name: Minoo Centeno History of Presenting Illness Chief Complaint Patient presents with  Abdominal Pain  
  pt reports abdominal pain beginning Saturday after eating with nausea, reports cough x2 weeks  Cough  Nausea History Provided By: Patient HPI: Minoo Centeno, 59 y.o. female with no significant pmhx, presents ambulatory to the ED with cc of new onset epigastric abd pain described as fullness and productive cough with associated nausea x 4 days. Pt began coughing ~4 days ago, noting that the force made her feel as though she needed to vomit though she never did. On 9/7, she began with epigastric pain after eating a meal, stating that it feels like her food was \"stuck\". Pt took OTC gas medication with some relief and notes that drinking a soda allows her to burp, which also provides her relief. Endorses having 2 normal BMs yesterday. Denies hematochezia, melena, fever, chills. Pt drinks alcohol socially and smokes 1ppd. Denies hx of DM, HTN, GI problems, hepatitis, pancreatitis, hiatal hernia, blood clots, hx of kidney stones or renal problems. There are no other complaints, changes, or physical findings at this time. PCP: None Current Facility-Administered Medications Medication Dose Route Frequency Provider Last Rate Last Dose  sodium chloride (NS) flush 5-10 mL  5-10 mL IntraVENous Q8H Daniel Kraft MD      
 sodium chloride (NS) flush 5-10 mL  5-10 mL IntraVENous PRN Natasha Leavitt MD      
 
Current Outpatient Prescriptions Medication Sig Dispense Refill  omega 3-dha-epa-fish oil (FISH OIL) 100-160-1,000 mg cap Take  by mouth.  cefUROXime (CEFTIN) 500 mg tablet Take 1 Tab by mouth two (2) times a day for 10 days. 20 Tab 0  
 HYDROcodone-acetaminophen (NORCO) 5-325 mg per tablet Take 1 Tab by mouth every four (4) hours as needed for Pain. Max Daily Amount: 6 Tabs. 10 Tab 0  promethazine (PHENERGAN) 25 mg tablet Take 1 Tab by mouth every six (6) hours as needed. 12 Tab 0 Past History Past Medical History: 
History reviewed. No pertinent past medical history. Past Surgical History: 
History reviewed. No pertinent surgical history. Family History: 
History reviewed. No pertinent family history. Social History: 
Social History Substance Use Topics  Smoking status: Current Every Day Smoker Packs/day: 1.00 Types: Cigarettes  Smokeless tobacco: Never Used  Alcohol use No  
 
 
Allergies: 
No Known Allergies Review of Systems Review of Systems Constitutional: Negative. Negative for chills and fever. HENT: Negative. Negative for congestion and rhinorrhea. Respiratory: Positive for cough (productive). Negative for chest tightness and wheezing. Cardiovascular: Negative. Negative for chest pain and palpitations. Gastrointestinal: Positive for abdominal pain (epigastric fullness) and nausea. Negative for blood in stool, constipation and vomiting. Endocrine: Negative. Genitourinary: Negative. Negative for decreased urine volume, flank pain, hematuria and pelvic pain. Musculoskeletal: Negative. Negative for back pain and neck pain. Skin: Negative. Negative for color change, pallor and rash. Neurological: Negative. Negative for dizziness, seizures, weakness, numbness and headaches. Hematological: Negative. Negative for adenopathy. Psychiatric/Behavioral: Negative. All other systems reviewed and are negative. Physical Exam  
Physical Exam  
Constitutional: She is oriented to person, place, and time. She appears well-developed and well-nourished. No distress. HENT:  
Head: Normocephalic and atraumatic. Mouth/Throat: No oropharyngeal exudate. Eyes: Conjunctivae are normal. Pupils are equal, round, and reactive to light. Right eye exhibits no discharge. Left eye exhibits no discharge.  No scleral icterus. Neck: Normal range of motion. Neck supple. No JVD present. Cardiovascular: Normal rate, regular rhythm, normal heart sounds and intact distal pulses. Exam reveals no gallop and no friction rub. No murmur heard. Pulmonary/Chest: Effort normal and breath sounds normal. No stridor. No respiratory distress. She has no wheezes. She has no rales. She exhibits no tenderness. Abdominal: Soft. Normal aorta and bowel sounds are normal. She exhibits no distension, no pulsatile midline mass and no mass. There is no hepatosplenomegaly. There is tenderness in the left lower quadrant. There is no rigidity, no rebound, no guarding, no CVA tenderness, no tenderness at McBurney's point and negative Fuentes's sign. No hernia. Neurological: She is alert and oriented to person, place, and time. She displays normal reflexes. No cranial nerve deficit. She exhibits normal muscle tone. Coordination normal.  
Skin: Skin is warm. No rash noted. She is not diaphoretic. No pallor. Vitals reviewed. Diagnostic Study Results Labs - Recent Results (from the past 12 hour(s)) CBC WITH AUTOMATED DIFF Collection Time: 09/10/18  9:28 AM  
Result Value Ref Range WBC 8.0 3.6 - 11.0 K/uL  
 RBC 4.65 3.80 - 5.20 M/uL  
 HGB 14.3 11.5 - 16.0 g/dL HCT 43.4 35.0 - 47.0 % MCV 93.3 80.0 - 99.0 FL  
 MCH 30.8 26.0 - 34.0 PG  
 MCHC 32.9 30.0 - 36.5 g/dL  
 RDW 13.5 11.5 - 14.5 % PLATELET 745 566 - 948 K/uL MPV 10.6 8.9 - 12.9 FL  
 NRBC 0.0 0  WBC ABSOLUTE NRBC 0.00 0.00 - 0.01 K/uL NEUTROPHILS 67 32 - 75 % LYMPHOCYTES 23 12 - 49 % MONOCYTES 8 5 - 13 % EOSINOPHILS 2 0 - 7 % BASOPHILS 1 0 - 1 % IMMATURE GRANULOCYTES 0 0.0 - 0.5 % ABS. NEUTROPHILS 5.3 1.8 - 8.0 K/UL  
 ABS. LYMPHOCYTES 1.8 0.8 - 3.5 K/UL  
 ABS. MONOCYTES 0.6 0.0 - 1.0 K/UL  
 ABS. EOSINOPHILS 0.2 0.0 - 0.4 K/UL  
 ABS. BASOPHILS 0.1 0.0 - 0.1 K/UL  
 ABS. IMM. GRANS. 0.0 0.00 - 0.04 K/UL DF AUTOMATED METABOLIC PANEL, COMPREHENSIVE Collection Time: 09/10/18  9:28 AM  
Result Value Ref Range Sodium 139 136 - 145 mmol/L Potassium 4.5 3.5 - 5.1 mmol/L Chloride 106 97 - 108 mmol/L  
 CO2 29 21 - 32 mmol/L Anion gap 4 (L) 5 - 15 mmol/L Glucose 83 65 - 100 mg/dL BUN 9 6 - 20 MG/DL Creatinine 0.98 0.55 - 1.02 MG/DL  
 BUN/Creatinine ratio 9 (L) 12 - 20 GFR est AA >60 >60 ml/min/1.73m2 GFR est non-AA 58 (L) >60 ml/min/1.73m2 Calcium 9.0 8.5 - 10.1 MG/DL Bilirubin, total 0.4 0.2 - 1.0 MG/DL  
 ALT (SGPT) 18 12 - 78 U/L  
 AST (SGOT) 23 15 - 37 U/L Alk. phosphatase 68 45 - 117 U/L Protein, total 8.2 6.4 - 8.2 g/dL Albumin 3.0 (L) 3.5 - 5.0 g/dL Globulin 5.2 (H) 2.0 - 4.0 g/dL A-G Ratio 0.6 (L) 1.1 - 2.2 LIPASE Collection Time: 09/10/18  9:28 AM  
Result Value Ref Range Lipase 98 73 - 393 U/L  
URINALYSIS W/ REFLEX CULTURE Collection Time: 09/10/18  9:28 AM  
Result Value Ref Range Color YELLOW/STRAW Appearance CLOUDY (A) CLEAR Specific gravity 1.014 1.003 - 1.030    
 pH (UA) 6.5 5.0 - 8.0 Protein NEGATIVE  NEG mg/dL Glucose NEGATIVE  NEG mg/dL Ketone NEGATIVE  NEG mg/dL Bilirubin NEGATIVE  NEG Blood TRACE (A) NEG Urobilinogen 1.0 0.2 - 1.0 EU/dL Nitrites NEGATIVE  NEG Leukocyte Esterase LARGE (A) NEG    
 WBC >100 (H) 0 - 4 /hpf  
 RBC 5-10 0 - 5 /hpf Epithelial cells FEW FEW /lpf Bacteria 1+ (A) NEG /hpf  
 UA:UC IF INDICATED URINE CULTURE ORDERED (A) CNI Hyaline cast 2-5 0 - 5 /lpf  
TROPONIN I Collection Time: 09/10/18  9:28 AM  
Result Value Ref Range Troponin-I, Qt. <0.05 <0.05 ng/mL EKG, 12 LEAD, INITIAL Collection Time: 09/10/18  9:29 AM  
Result Value Ref Range Ventricular Rate 72 BPM  
 Atrial Rate 72 BPM  
 P-R Interval 132 ms QRS Duration 94 ms Q-T Interval 382 ms QTC Calculation (Bezet) 418 ms Calculated P Axis 81 degrees Calculated R Axis 91 degrees Calculated T Axis 36 degrees Diagnosis Normal sinus rhythm Rightward axis No previous ECGs available Radiologic Studies -  
CT ABD PELV WO CONT Final Result XR CHEST PA LAT Final Result CT Results  (Last 48 hours) 09/10/18 1113  CT ABD PELV WO CONT Final result Impression:  IMPRESSION:   
There is mild left hydroureteronephrosis, but no obstructing calculus is  
demonstrated. There are 2 small nonobstructing left renal calculi. Differential  
considerations include a recently passed calculus or infection. Correlate with  
urinalysis. If the patient's symptoms do not improve after an appropriate  
clinical interval, consider further evaluation with CT without and with  
contrast.  
   
   
  
 Narrative:  EXAM:  CT abdomen pelvis without contrast  
   
INDICATION: Flank pain. COMPARISON: None. TECHNIQUE: Helical CT of the abdomen  and pelvis  without contrast. Coronal and  
sagittal reformats are performed. CT dose reduction was achieved through use of  
a standardized protocol tailored for this examination and automatic exposure  
control for dose modulation. FINDINGS:   
Solid organ evaluation is limited without contrast.   
   
The visualized lung bases demonstrate no mass or consolidation. The heart size  
is normal. There is no pericardial or pleural effusion. There are 2 small nonobstructing left renal calculi. There is no right renal,  
ureteral, or urinary bladder calculus. There is mild left hydroureteronephrosis  
and mild left perinephric fluid and stranding. There is no right hydronephrosis. The liver, spleen, pancreas, and adrenal glands are normal.  The gall bladder is  
present  without intra- or extra-hepatic biliary dilatation. There are no dilated bowel loops. The appendix is normal.    
   
There are no enlarged lymph nodes. There is no free fluid or free air.  The  
 aorta tapers without aneurysm. There is aortoiliac atherosclerosis. The urinary bladder is unremarkable. There is no pelvic mass. The bony structures are age-appropriate. CXR Results  (Last 48 hours) 09/10/18 1009  XR CHEST PA LAT Final result Impression:  IMPRESSION:  
1. The lungs are mildly hyperinflated but clear of an acute process Narrative:  Exam:  2 view chest  
   
Indication: Abdominal pain, cough and nausea. COMPARISON: None PA and lateral views demonstrate normal heart size. The lungs are mildly hyperinflated. The lungs are clear acute process. No  
adenopathy. There are degenerative changes of the thoracic spine. Medical Decision Making I am the first provider for this patient. I reviewed the vital signs, available nursing notes, past medical history, past surgical history, family history and social history. Vital Signs-Reviewed the patient's vital signs. Patient Vitals for the past 12 hrs: 
 Temp Pulse Resp BP SpO2  
09/10/18 1245 - - - 113/85 96 % 09/10/18 1215 - - - 122/73 96 % 09/10/18 1145 - - - 121/63 96 % 09/10/18 1100 - - - 129/69 94 % 09/10/18 1035 - - - 113/83 95 % 09/10/18 1003 - - - 106/68 97 % 09/10/18 0911 98.1 °F (36.7 °C) 83 16 104/70 98 % Records Reviewed: Nursing Notes and Old Medical Records EKG interpretation: (Preliminary) 5305 Rhythm: normal sinus rhythm; and regular . Rate (approx.): 72 bpm; Axis: rightward axis; ID interval: normal; QRS interval: normal ; ST/T wave: normal. 
Written by Lilian Lazaro as dictated by Shannon Gonzalez MD 
 
Provider Notes (Medical Decision Making): DDx: PNA, PUD, hepatitis, pancreatitis, coronary syndrome, biliary colic, AAA Impression Plan: Hx of tobacco abuse, to the ER with initial cough with possible post-tussive nausea.  Since then has developed epigastric fullness and upper abd pain. Social drinker. Plan will be obtain labs, XR. If normal, may need US to evaluate for biliary process. ED Course:  
Initial assessment performed. The patients presenting problems have been discussed, and they are in agreement with the care plan formulated and outlined with them. I have encouraged them to ask questions as they arise throughout their visit. CONSULT NOTE:  
12:29 PM 
Mirta Kyle MD spoke with Dr. Cici Cline, Specialty: Urology Discussed pt's hx, disposition, and available diagnostic and imaging results. Reviewed care plans. Consultant agrees with abx, would like her to f/u in office in 2 weeks. Written by Tino Quiñones, ED Scribe, as dictated by Mirta Kyle MD. Disposition: 
DISCHARGE NOTE 
1:13 PM 
The patient has been re-evaluated and is ready for discharge. Reviewed available results with patient. Counseled pt on diagnosis and care plan. Pt has expressed understanding, and all questions have been answered. Pt agrees with plan and agrees to F/U as recommended, or return to the ED if their sxs worsen. Discharge instructions have been provided and explained to the pt, along with reasons to return to the ED. Written by Tino Quiñones, ED Scribe, as dictated by Mirta Kyle MD. 
 
PLAN: 
1. Discharge Medication List as of 9/10/2018  1:07 PM  
  
START taking these medications Details  
cefUROXime (CEFTIN) 500 mg tablet Take 1 Tab by mouth two (2) times a day for 10 days. , Print, Disp-20 Tab, R-0  
  
HYDROcodone-acetaminophen (NORCO) 5-325 mg per tablet Take 1 Tab by mouth every four (4) hours as needed for Pain. Max Daily Amount: 6 Tabs., Print, Disp-10 Tab, R-0  
  
promethazine (PHENERGAN) 25 mg tablet Take 1 Tab by mouth every six (6) hours as needed. , Print, Disp-12 Tab, R-0  
  
  
CONTINUE these medications which have NOT CHANGED  Details  
omega 3-dha-epa-fish oil (FISH OIL) 100-160-1,000 mg cap Take  by mouth., Historical Med 2. Follow-up Information Follow up With Details Comments Contact Info Meg Julian MD Schedule an appointment as soon as possible for a visit in 1 week  [address] [facility] [address] [facility] 
[phone] [facility] EMERGENCY DEPT   [address] [id number] [facility] 
[phone] [facility] EMERGENCY DEPT  If symptoms worsen [address] 
[phone] Return to ED if worse Diagnosis Clinical Impression: 1. Pyelonephritis Attestations: This note is prepared by Khadijah Yates acting as scribe for MD Lesly Tavarez MD : The scribe's documentation has been prepared under my direction and personally reviewed by me in its entirety. I confirm that the note above accurately reflects all work, treatment, procedures, and medical decision making performed by me.

## 2018-09-10 NOTE — ED NOTES
Pt provided with meal tray. Dr. Sharita Verdugo at bedside discussing discharge instructions with pt. Pt to be discharged following treatment.

## 2018-09-10 NOTE — LETTER
[address] 
[facility] EMERGENCY DEPT 
[address] [place] [address] [address] [id number] 
[phone] Work/School Note Date: 9/10/2018 To Whom It May concern: 
 
Ashwini Ramirez was seen and treated today in the emergency room by the following provider(s): 
Attending Provider: Ayesha Gramajo MD. Ashwini Ramirez No work till 9/12/18 Sincerely, Ayesha Gramajo MD

## 2018-09-12 LAB
BACTERIA SPEC CULT: ABNORMAL
BACTERIA SPEC CULT: ABNORMAL
CC UR VC: ABNORMAL
SERVICE CMNT-IMP: ABNORMAL

## 2019-10-03 ENCOUNTER — HOSPITAL ENCOUNTER (OUTPATIENT)
Dept: CT IMAGING | Age: 59
Discharge: HOME | End: 2019-10-03
Attending: OPHTHALMOLOGY
Payer: COMMERCIAL

## 2019-10-03 DIAGNOSIS — H02.841 EDEMA OF RIGHT UPPER EYELID: ICD-10-CM

## 2019-10-03 DIAGNOSIS — H02.844 EDEMA OF LEFT UPPER EYELID: ICD-10-CM

## 2019-10-03 DIAGNOSIS — H05.223 EDEMA OF BILATERAL ORBIT: ICD-10-CM

## 2019-10-03 PROCEDURE — 70482 CT ORBIT/EAR/FOSSA W/O&W/DYE: CPT

## 2019-10-03 PROCEDURE — 74011636320 HC RX REV CODE- 636/320: Performed by: OPHTHALMOLOGY

## 2019-10-03 RX ORDER — SODIUM CHLORIDE 0.9 % (FLUSH) 0.9 %
10 SYRINGE (ML) INJECTION
Status: COMPLETED | OUTPATIENT
Start: 2019-10-03 | End: 2019-10-03

## 2019-10-03 RX ADMIN — Medication 10 ML: at 07:57

## 2019-10-03 RX ADMIN — IOPAMIDOL 100 ML: 755 INJECTION, SOLUTION INTRAVENOUS at 07:57

## 2019-11-23 ENCOUNTER — HOSPITAL ENCOUNTER (OUTPATIENT)
Dept: CT IMAGING | Age: 59
Discharge: HOME | End: 2019-11-23
Attending: OPHTHALMOLOGY
Payer: COMMERCIAL

## 2019-11-23 DIAGNOSIS — H02.844 EDEMA OF LEFT UPPER EYELID: ICD-10-CM

## 2019-11-23 DIAGNOSIS — H05.223 EDEMA OF BOTH ORBITS: ICD-10-CM

## 2019-11-23 DIAGNOSIS — H02.841 EDEMA OF RIGHT UPPER EYELID: ICD-10-CM

## 2019-11-23 PROCEDURE — 74011636320 HC RX REV CODE- 636/320: Performed by: OPHTHALMOLOGY

## 2019-11-23 PROCEDURE — 70481 CT ORBIT/EAR/FOSSA W/DYE: CPT

## 2019-11-23 RX ORDER — SODIUM CHLORIDE 0.9 % (FLUSH) 0.9 %
10 SYRINGE (ML) INJECTION
Status: COMPLETED | OUTPATIENT
Start: 2019-11-23 | End: 2019-11-23

## 2019-11-23 RX ADMIN — IOPAMIDOL 100 ML: 755 INJECTION, SOLUTION INTRAVENOUS at 12:00

## 2019-11-23 RX ADMIN — Medication 10 ML: at 12:00

## 2019-12-18 ENCOUNTER — OFFICE VISIT (OUTPATIENT)
Dept: RHEUMATOLOGY | Age: 59
End: 2019-12-18

## 2019-12-18 VITALS
HEART RATE: 79 BPM | BODY MASS INDEX: 31.64 KG/M2 | WEIGHT: 226 LBS | RESPIRATION RATE: 18 BRPM | SYSTOLIC BLOOD PRESSURE: 118 MMHG | OXYGEN SATURATION: 97 % | TEMPERATURE: 98.5 F | HEIGHT: 71 IN | DIASTOLIC BLOOD PRESSURE: 76 MMHG

## 2019-12-18 DIAGNOSIS — H20.9 UVEITIS: Primary | ICD-10-CM

## 2019-12-18 NOTE — LETTER
NOTIFICATION RETURN TO WORK / SCHOOL 
 
12/18/2019 8:54 AM 
 
Ms. Renu Atkins [address] [address] To Whom It May Concern: 
 
Renu Atkins is currently under the care of [address] [place] [address]. She will return to work/school on: 12/18/19. If there are questions or concerns please have the patient contact our office. Sincerely, Farrukh Cook MD

## 2019-12-18 NOTE — PROGRESS NOTES
Jani Wilcox is a 62 y.o. female  HIPAA verified by two patient identifiers. Health Maintenance Due   Topic    Hepatitis C Screening     Pneumococcal 0-64 years (1 of 1 - PPSV23)    DTaP/Tdap/Td series (1 - Tdap)    Shingrix Vaccine Age 50> (1 of 2)    FOBT Q 1 YEAR AGE 54-65     BREAST CANCER SCRN MAMMOGRAM     PAP AKA CERVICAL CYTOLOGY     Influenza Age 5 to Adult      Visit Vitals  /76 (BP 1 Location: Right arm, BP Patient Position: Sitting)   Pulse 79   Temp 98.5 °F (36.9 °C) (Oral)   Resp 18   Ht 5' 11\" (1.803 m)   Wt 226 lb (102.5 kg)   SpO2 97%   BMI 31.52 kg/m²       Pain Scale: 1/10  Pain Location:     1. Have you been to the ER, urgent care clinic since your last visit? Hospitalized since your last visit? No    2. Have you seen or consulted any other health care providers outside of the [address] [place] [address] since your last visit? Include any pap smears or colon screening.  No

## 2019-12-18 NOTE — PROGRESS NOTES
REASON FOR VISIT    This is the initial evaluation for Ms. Renny Slater a 62 y.o.  female for question of joint pain. The patient is referred to the [facility] at the request of Dr. Lanny Chan. HISTORY OF PRESENT ILLNESS     Previous medical records reviewed and summarized: yes    mHAQ:0  Pain scale: 1/10    This is a 62 y.o. female with hx of COPD. The patient notes her left eye and sometimes right eye as well has been swelling. It feels like there is something in the eye. She saw her PCP who sent her to eye doctor who then sent her to an eye specialist. She had blood work and 2 CT scans and she was told she might have lupus. The eye issue has been going on for 1.5 years. She was doing OTC eye drops and they were helping. The eye doctor advised her to get eye drops as well. She was put on steroids orally by the eye doctor for the eye issue. She was given prednisone taper. The prednisone resolved the eye swelling. Then PCP also gave her prednisone taper of 60 mg and it helped with eye swelling. She saw Dr. Chana Hazel. When she wakes up she has left lateral hip and left shoulder pain. No joint swelling. The pain gets better as she starts moving around. The pain started about 2 weeks ago. She denies getting hurt. She was told she had inflammation in her eye due to SLE. REVIEW OF SYSTEMS    A 15 point review of systems was performed and summarized below. The questionnaire was reviewed with the patient and scanned into the patient's medical record.     General: denies recent weight gain, recent weight loss, fatigue, weakness, fever, drenching night sweats  Musculoskeletal: endorses morning stiffness, muscle paindenies joint pain, joint swelling, ,   Ears: denies ringing in ears, hearing loss, deafness  Eyes: endorses light sensitive, redness, double vision, dryness, foreign body sensationdenies pain, blindness,  blurred vision, excess tearing,   Mouth:  denies sore tongue, oral ulcers, loss of taste, dryness, increased dental caries  Nose: denies nosebleeds, nasal ulcers  Throat: endorses pain in jaw while chewingdenies food stuck when swallowing, difficulty with swallowing, hoarseness,   Neck:  denies swollen glands, tender glands  Cardiopulmonary: endorses shortness of breath on exertion, productive cough, denies pain in chest with deep breaths, pain in chest when lying down, murmurs, sudden changes in heart beat, wheezing, dry cough, shortness of breath at rest, coughing of blood  Gastrointestinal: denies nausea, heartburn, stomach pain relieved by food, chronic constipation, chronic diarrhea, blood in stools, black stools  Genitourinary:  denies vaginal dryness, pain or burning on urination, blood in urine, cloudy urine, vaginal ulcers, penile ulcers  Hematologic: denies anemia, bleeding tendency, blood clots, bleeding gums  Skin:  denies easy bruising, hair loss, rash, rash worsened after sun exposure, hives/urticaria, skin thickening, skin tightness, nodules/bumps, color changes of hands or feet in the cold (Raynaud's)  Neurologic:  denies numbness or tingling in hands, numbness or tingling in feet, muscle weakness  Psychiatric:  denies depression, excessive worries, PTSD, Bipolar  Sleep: endorses daytime somnolence, denies poor sleep (6 hours), denies snoring, apnea, difficulty falling asleep, difficulty staying asleep     PAST MEDICAL HISTORY    Past Medical History:   Diagnosis Date    COPD (chronic obstructive pulmonary disease) ([facility] [address]         History reviewed. No pertinent surgical history. FAMILY HISTORY    History reviewed. No pertinent family history.     SOCIAL HISTORY    Social History     Tobacco Use    Smoking status: Current Every Day Smoker     Packs/day: 1.00     Types: Cigarettes    Smokeless tobacco: Never Used   Substance Use Topics    Alcohol use: No    Drug use: Not on file       MEDICATIONS    Current Outpatient Medications   Medication Sig Dispense Refill    omega 3-dha-epa-fish oil (FISH OIL) 100-160-1,000 mg cap Take  by mouth.  HYDROcodone-acetaminophen (NORCO) 5-325 mg per tablet Take 1 Tab by mouth every four (4) hours as needed for Pain. Max Daily Amount: 6 Tabs. 10 Tab 0    promethazine (PHENERGAN) 25 mg tablet Take 1 Tab by mouth every six (6) hours as needed. 12 Tab 0       ALLERGIES    No Known Allergies    PHYSICAL EXAMINATION    Visit Vitals  /76 (BP 1 Location: Right arm, BP Patient Position: Sitting)   Pulse 79   Temp 98.5 °F (36.9 °C) (Oral)   Resp 18   Ht 5' 11\" (1.803 m)   Wt 226 lb (102.5 kg)   SpO2 97%   BMI 31.52 kg/m²     Body mass index is 31.52 kg/m². General: NAD  HEENT: PERRL, anicteric, mild swelling noted on b/l eye lids. Some erythema in left eye sclera  Lymphatic: No cervical or axillary lymphadenopathy. Cardiovascular: S1, S2,no R/M/G  Pulmonary: CTA b/l. No wheezes/rales/rhonchi. Abdominal: Soft,NTND, + BS. Skin: No rash, nodules, or periungual changes. Neuro: Alert; able to carry normal conversation    Musculoskeletal:   Cervical & Lumbar Spine  Neck and spine have no noted deformities or signs of inflammation. Curvature of cervical, thoracic, and lumbar spine are within normal limits. Wrist, Hand, & Fingers  No bony deformities, inflammation, or tenderness of bony prominences. No anatomical snuff box tenderness; Full ROM in DIP, PIP, MCP, & carpal joints & with supination and pronation. Elbow  No bony deformities, inflammation, or tenderness in olecranon, medial, lateral epicondyle elbow. Full ROM upon flexion and extension. Shoulder  No bony deformities, inflammation, or tenderness in rotator cuff, biceps tendon, or acromioclavicular joint. Full ROM and strength in shoulder upon adduction, abduction, internal and external rotation. Hip  No bony deformities, inflammation, or tenderness in hip joint. Knee  FROM of the knee. NO swelling or warmth noted.  No bony deformity noted    Ankle/toes  No bony deformities, inflammation or tenderness    DATA REVIEW    Prior medical records were reviewed and if applicable are summarized as below:    Labs:   N/A    Imaging:   N/A    ASSESSMENT AND PLAN    A 62 y.o. female with hx of COPD presents for evaluation of irritation and swelling around her eyes. The patient was told she had SLE but I do not have any records to confirm this. Further her clinical picture is not very consistent with SLE. She will need further evaluation to determine the etiology of her symptoms. # Eye irritation:  - will obtain records from ophtho.   - depending on the results, will see if she needs more blood work and I will then contact he patient to order the blood work. # COPD:  - advised smoking cessation    RTC in 3 weeks    The patient voiced understanding of the aforementioned assessment and plan. Summary of plan was provided in the After Visit Summary patient instructions. I also provided education about MyChart setup and utility. Ms. Tram Torres has a reminder for a \"due or due soon\" health maintenance. I have asked that she contact her primary care provider for follow-up on this health maintenance. TODAY'S ORDERS    No orders of the defined types were placed in this encounter. Future Appointments   Date Time Provider Savita Paredes   1/8/2020  8:20 AM Thong Dawn MD [address] [facility]       Kishan Dubon MD    Adult Rheumatology   [facility]  A Part of [facility], [address] [place] [address]   Phone [phone]  Fax [phone]    ADDENDUM:   CT orbit (11/2019): normal  9/2019: ANCA, TSH, cbc, BMP, ACE.  T. Pallidum, ESR, CRP, TPO, GOPI normal, RF positive

## 2019-12-18 NOTE — PATIENT INSTRUCTIONS
Please fill out your [address] you will receive after your visit in the mail or via [phone]!

## 2020-01-02 LAB
CCP IGA+IGG SERPL IA-ACNC: 6 UNITS (ref 0–19)
COMMENT, 144067: NORMAL
ENA SS-A AB SER-ACNC: <0.2 AI (ref 0–0.9)
ENA SS-B AB SER-ACNC: <0.2 AI (ref 0–0.9)
GAMMA INTERFERON BACKGROUND BLD IA-ACNC: 0.05 IU/ML
HBV CORE AB SERPL QL IA: NEGATIVE
HBV CORE IGM SERPL QL IA: NEGATIVE
HBV E AB SERPL QL IA: NEGATIVE
HBV E AG SERPL QL IA: NEGATIVE
HBV SURFACE AB SER QL: NON REACTIVE
HBV SURFACE AG SERPL QL IA: NEGATIVE
HCV AB S/CO SERPL IA: 0.1 S/CO RATIO (ref 0–0.9)
M TB IFN-G BLD-IMP: NEGATIVE
M TB IFN-G CD4+ BCKGRND COR BLD-ACNC: 0.06 IU/ML
MITOGEN IGNF BLD-ACNC: >10 IU/ML
QUANTIFERON INCUBATION, QF1T: NORMAL
QUANTIFERON TB2 AG: 0.05 IU/ML
SERVICE CMNT-IMP: NORMAL

## 2020-01-09 ENCOUNTER — OFFICE VISIT (OUTPATIENT)
Dept: RHEUMATOLOGY | Age: 60
End: 2020-01-09

## 2020-01-09 VITALS
OXYGEN SATURATION: 98 % | WEIGHT: 227 LBS | TEMPERATURE: 98.1 F | HEART RATE: 73 BPM | HEIGHT: 71 IN | RESPIRATION RATE: 20 BRPM | SYSTOLIC BLOOD PRESSURE: 114 MMHG | BODY MASS INDEX: 31.78 KG/M2 | DIASTOLIC BLOOD PRESSURE: 65 MMHG

## 2020-01-09 DIAGNOSIS — H05.10 ORBITAL INFLAMMATION, CHRONIC: ICD-10-CM

## 2020-01-09 DIAGNOSIS — Z79.60 LONG-TERM USE OF IMMUNOSUPPRESSANT MEDICATION: Primary | ICD-10-CM

## 2020-01-09 RX ORDER — ALBUTEROL SULFATE 90 UG/1
AEROSOL, METERED RESPIRATORY (INHALATION)
COMMUNITY
Start: 2019-12-30

## 2020-01-09 RX ORDER — FOLIC ACID 1 MG/1
1 TABLET ORAL DAILY
Qty: 90 TAB | Refills: 0 | Status: SHIPPED | OUTPATIENT
Start: 2020-01-09 | End: 2020-02-10 | Stop reason: ALTCHOICE

## 2020-01-09 RX ORDER — METHOTREXATE 2.5 MG/1
15 TABLET ORAL
Qty: 30 TAB | Refills: 3 | Status: SHIPPED | OUTPATIENT
Start: 2020-01-11 | End: 2020-02-10 | Stop reason: ALTCHOICE

## 2020-01-09 NOTE — PROGRESS NOTES
REASON FOR VISIT    Patient presents for a follow up visit for    ICD-10-CM ICD-9-CM    1. Long-term use of immunosuppressant medication Z79.899 V58.69    2. 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        HISTORY OF DISEASE: Non specific orbital inflammation    Year of diagnosis: 2019  First visit with me: 12/2019  Cumulative disease manifestations: orbital inflammation  Positive serologies/labs: RF  Negative serologies/labs: Other co-morbidities: + smoking  Relapses:      Past treatment history:  Prednisone: Oral tapers multiple times  Non-biologic DMARD:   Biologic:  Other:    HISTORY OF PRESENT ILLNESS     Previous medical records reviewed and summarized: yes  Review of ophtho records: eye edema responded to oral steroids and the ophthalmologist suspects non specific orbital inflammation    mHAQ:0  Pain scale: 1/10  The eyes are still swelling up. Today they look good because she was given prednisone by over the holidays by the eye doctor. She was given oral steroids. She will be done with prednisone today.        REVIEW OF SYSTEMS    Positives as per history  Negatives as follows:  Roseanna Diaz:    Denies unexplained persistent fevers, weight change, chronic fatigue  HEAD/EYES:              Denies eye redness, blurry vision or sudden loss of vision, dry eyes, HA, temporal artery pain  ENT:                            Denies oral/nasal ulcers, recurrent sinus infections, dry mouth  RESPIRATORY:         No pleuritic pain, history of pleural effusions, hemoptysis, exertional dyspnea  CARDIOVASCULAR:             Denies chest pain, history of pericardial effusions  GASTRO:                    Denies heartburn, esophageal dysmotility, abdominal pain, nausea, vomiting, diarrhea, blood in the stool  HEMATOLOGIC:        No easy bruising, purpura, swollen lymph nodes  SKIN:                           Denies alopecia, ulcers, nodules, sun sensitivity, unexplained persistent rash   VASCULAR:                Denies edema, cyanosis, raynaud phenomenon  NEUROLOGIC:           Denies specific muscle weakness, paresthesias   PSYCHIATRIC:           No sleep disturbance / snoring, depression, anxiety  MSK:                           No morning stiffness >1 hour, SI joint pain, persistent joint swelling, persistent joint pain    PAST MEDICAL HISTORY    Past Medical History:   Diagnosis Date    COPD (chronic obstructive pulmonary disease) ([facility] [address]         History reviewed. No pertinent surgical history. FAMILY HISTORY    History reviewed. No pertinent family history. SOCIAL HISTORY    Social History     Tobacco Use    Smoking status: Current Every Day Smoker     Packs/day: 1.00     Types: Cigarettes    Smokeless tobacco: Never Used   Substance Use Topics    Alcohol use: No    Drug use: Not on file       MEDICATIONS    Current Outpatient Medications   Medication Sig Dispense Refill    VENTOLIN HFA 90 mcg/actuation inhaler       [START ON 1/11/2020] methotrexate (RHEUMATREX) 2.5 mg tablet Take 6 Tabs by mouth Every Saturday. 30 Tab 3    folic acid (FOLVITE) 1 mg tablet Take 1 Tab by mouth daily. 90 Tab 0    omega 3-dha-epa-fish oil (FISH OIL) 100-160-1,000 mg cap Take  by mouth.  HYDROcodone-acetaminophen (NORCO) 5-325 mg per tablet Take 1 Tab by mouth every four (4) hours as needed for Pain. Max Daily Amount: 6 Tabs. 10 Tab 0    promethazine (PHENERGAN) 25 mg tablet Take 1 Tab by mouth every six (6) hours as needed. 12 Tab 0       ALLERGIES    No Known Allergies    PHYSICAL EXAMINATION    Visit Vitals  /65 (BP 1 Location: Right arm, BP Patient Position: Sitting)   Pulse 73   Temp 98.1 °F (36.7 °C) (Oral)   Resp 20   Ht 5' 11\" (1.803 m)   Wt 227 lb (103 kg)   SpO2 98%   BMI 31.66 kg/m²     Body mass index is 31.66 kg/m². General: NAD  HEENT: PERRL, anicteric, mild swelling noted on b/l eye lids. B/l cheeks with faint flat erythema  Lymphatic: No cervical or axillary lymphadenopathy.   Cardiovascular: S1, S2,no R/M/G  Pulmonary: CTA b/l. No wheezes/rales/rhonchi. Abdominal: Soft,NTND, + BS. Skin: No rash, nodules, or periungual changes. Neuro: Alert; able to carry normal conversation    Musculoskeletal:   Cervical & Lumbar Spine  Neck and spine have no noted deformities or signs of inflammation. Curvature of cervical, thoracic, and lumbar spine are within normal limits. Wrist, Hand, & Fingers  No bony deformities, inflammation, or tenderness of bony prominences. No anatomical snuff box tenderness; Full ROM in DIP, PIP, MCP, & carpal joints & with supination and pronation. Elbow  No bony deformities, inflammation, or tenderness in olecranon, medial, lateral epicondyle elbow. Full ROM upon flexion and extension. Shoulder  No bony deformities, inflammation, or tenderness in rotator cuff, biceps tendon, or acromioclavicular joint. Full ROM and strength in shoulder upon adduction, abduction, internal and external rotation. Hip  No bony deformities, inflammation, or tenderness in hip joint. Knee  FROM of the knee. NO swelling or warmth noted. No bony deformity noted    Ankle/toes  No bony deformities, inflammation or tenderness    DATA REVIEW    Prior medical records were reviewed and if applicable are summarized as below:    Labs:   12/2019: Quant gold, HBV, HCV negative, SSA/B, CCP negative  9/2019: ANCA, TSH, cbc, BMP, ACE. T. Pallidum, ESR, CRP, TPO, GOPI normal, RF positive    Imaging:   CT orbit (11/2019): normal    ASSESSMENT AND PLAN    A 62 y.o. female with hx of COPD, nonspecific ocular inflammation presents for a follow up visit. The patient's RF is positive but she has no features of sjogrens syndrome or rheumatoid arthritis. Her eye issues are therefore idiopathic. Given that she is steroid dependent, she will benefit from DMARD therapy as a steroid sparing agent. # NSOI:  - will start patient on methotrexate 15 mg oral weekly.  I explained the risks and benefits of methotrexate and answered all the questions in detail.   - advised patient to practice abstinence from alcohol. Will monitor for this every visit  - daily folic acid    # COPD:  - advised smoking cessation    # Medication Toxicity Monitoring:  - cbc, cmp every 3 months  - Hepatitis B, C: negative 12/2019  - Quant gold: negative 12/2019  - Immunizations: Discuss at next visit  - bone health: Discuss at next visit    RTC in 4 weeks    The patient voiced understanding of the aforementioned assessment and plan. Summary of plan was provided in the After Visit Summary patient instructions. I also provided education about MyChart setup and utility. Ms. Maco Bustamante has a reminder for a \"due or due soon\" health maintenance. I have asked that she contact her primary care provider for follow-up on this health maintenance.     TODAY'S ORDERS    Orders Placed This Encounter    VENTOLIN HFA 90 mcg/actuation inhaler    methotrexate (RHEUMATREX) 2.5 mg tablet    folic acid (FOLVITE) 1 mg tablet       Future Appointments   Date Time Provider Department Center   2/10/2020  9:40 AM Harper Raymond  Anya Salazar MD    Adult Rheumatology   [facility]  A Part of [facility], [address] [place] [address], [address] [place] [address]   Phone [phone]  Fax [phone]

## 2020-01-09 NOTE — PATIENT INSTRUCTIONS
Please fill out your [address] you will receive after your visit in the mail or via [phone]

## 2020-01-09 NOTE — PROGRESS NOTES
Chief Complaint   Patient presents with    Other     uveitis     1. Have you been to the ER, urgent care clinic since your last visit? Hospitalized since your last visit? No    2. Have you seen or consulted any other health care providers outside of the [address] [place] [address] since your last visit? Include any pap smears or colon screening.  No

## 2020-02-10 ENCOUNTER — OFFICE VISIT (OUTPATIENT)
Dept: RHEUMATOLOGY | Age: 60
End: 2020-02-10

## 2020-02-10 VITALS
HEIGHT: 71 IN | SYSTOLIC BLOOD PRESSURE: 124 MMHG | WEIGHT: 224.7 LBS | HEART RATE: 87 BPM | TEMPERATURE: 98.3 F | RESPIRATION RATE: 20 BRPM | OXYGEN SATURATION: 96 % | DIASTOLIC BLOOD PRESSURE: 80 MMHG | BODY MASS INDEX: 31.46 KG/M2

## 2020-02-10 DIAGNOSIS — Z79.60 LONG-TERM USE OF IMMUNOSUPPRESSANT MEDICATION: Primary | ICD-10-CM

## 2020-02-10 DIAGNOSIS — H05.10 ORBITAL INFLAMMATION, CHRONIC: ICD-10-CM

## 2020-02-10 RX ORDER — AZATHIOPRINE 50 MG/1
100 TABLET ORAL DAILY
Qty: 60 TAB | Refills: 3 | Status: SHIPPED | OUTPATIENT
Start: 2020-02-10

## 2020-02-10 RX ORDER — PREDNISONE 20 MG/1
20 TABLET ORAL DAILY
Qty: 30 TAB | Refills: 0 | Status: SHIPPED | OUTPATIENT
Start: 2020-02-10

## 2020-02-10 NOTE — PROGRESS NOTES
Chief Complaint   Patient presents with    Other     orbital inflammation     1. Have you been to the ER, urgent care clinic since your last visit? Hospitalized since your last visit? No    2. Have you seen or consulted any other health care providers outside of the [address] [place] [address] since your last visit? Include any pap smears or colon screening.  Yes Where: PCP

## 2020-02-10 NOTE — PATIENT INSTRUCTIONS
Please fill out your [address] you will receive after your visit in the mail or via [phone]

## 2020-02-10 NOTE — PROGRESS NOTES
REASON FOR VISIT    Patient presents for a follow up visit for    ICD-10-CM ICD-9-CM    1. Long-term use of immunosuppressant medication Z79.899 V58.69 CBC WITH AUTOMATED DIFF      METABOLIC PANEL, COMPREHENSIVE   2. 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        HISTORY OF DISEASE: Non specific orbital inflammation    Year of diagnosis: 2019  First visit with me: 12/2019  Cumulative disease manifestations: orbital inflammation  Positive serologies/labs: RF  Negative serologies/labs: Other co-morbidities: + smoking  Relapses:      Past treatment history:  Prednisone: Oral tapers multiple times  Non-biologic DMARD:  Methotrexate 15 mg oral weekly (1/2020-present)  Biologic:  Other: folic acid daily    HISTORY OF PRESENT ILLNESS     Previous medical records reviewed and summarized: yes  Review of ophtho records: eye edema responded to oral steroids and the ophthalmologist suspects non specific orbital inflammation    mHAQ:0  Pain scale: 1/10  She took methotrexate for 3 weeks but after the second week she started getting shoulder pain. It didn't go away when she stopped the methotrexate. Patient is still drinking heavily.     REVIEW OF SYSTEMS    Positives as per history  Negatives as follows:  Sumanth Resendiz:    Denies unexplained persistent fevers, weight change, chronic fatigue  HEAD/EYES:              Denies eye redness, blurry vision or sudden loss of vision, dry eyes, HA, temporal artery pain  ENT:                            Denies oral/nasal ulcers, recurrent sinus infections, dry mouth  RESPIRATORY:         No pleuritic pain, history of pleural effusions, hemoptysis, exertional dyspnea  CARDIOVASCULAR:             Denies chest pain, history of pericardial effusions  GASTRO:                    Denies heartburn, esophageal dysmotility, abdominal pain, nausea, vomiting, diarrhea, blood in the stool  HEMATOLOGIC:        No easy bruising, purpura, swollen lymph nodes  SKIN:                           Denies alopecia, ulcers, nodules, sun sensitivity, unexplained persistent rash   VASCULAR:                Denies edema, cyanosis, raynaud phenomenon  NEUROLOGIC:           Denies specific muscle weakness, paresthesias   PSYCHIATRIC:           No sleep disturbance / snoring, depression, anxiety  MSK:                           No morning stiffness >1 hour, SI joint pain, persistent joint swelling, persistent joint pain    PAST MEDICAL HISTORY    Past Medical History:   Diagnosis Date    COPD (chronic obstructive pulmonary disease) ([facility] [address]         History reviewed. No pertinent surgical history. FAMILY HISTORY    History reviewed. No pertinent family history. SOCIAL HISTORY    Social History     Tobacco Use    Smoking status: Current Every Day Smoker     Packs/day: 1.00     Types: Cigarettes    Smokeless tobacco: Never Used   Substance Use Topics    Alcohol use: No    Drug use: Not on file       MEDICATIONS    Current Outpatient Medications   Medication Sig Dispense Refill    azaTHIOprine (IMURAN) 50 mg tablet Take 2 Tabs by mouth daily. START 1 tab daily for 14 days then 2 tabs daily 60 Tab 3    predniSONE (DELTASONE) 20 mg tablet Take 20 mg by mouth daily. 30 Tab 0    calcium carbonate-vitamin D3 (CALTRATE 600 PLUS D) 600 mg (1,500 mg)-800 unit chew Take 2 Tabs by mouth daily. 180 Tab 1    VENTOLIN HFA 90 mcg/actuation inhaler       omega 3-dha-epa-fish oil (FISH OIL) 100-160-1,000 mg cap Take  by mouth.  HYDROcodone-acetaminophen (NORCO) 5-325 mg per tablet Take 1 Tab by mouth every four (4) hours as needed for Pain. Max Daily Amount: 6 Tabs. 10 Tab 0    promethazine (PHENERGAN) 25 mg tablet Take 1 Tab by mouth every six (6) hours as needed.  12 Tab 0       ALLERGIES    No Known Allergies    PHYSICAL EXAMINATION    Visit Vitals  /80 (BP 1 Location: Right arm, BP Patient Position: Sitting)   Pulse 87   Temp 98.3 °F (36.8 °C) (Oral)   Resp 20   Ht 5' 11\" (1.803 m)   Wt 224 lb 11.2 oz (101.9 kg) SpO2 96%   BMI 31.34 kg/m²     Body mass index is 31.34 kg/m². General: NAD  HEENT: PERRL, anicteric, mild swelling noted on b/l eye lids. B/l cheeks with faint flat erythema  Lymphatic: No cervical or axillary lymphadenopathy. Cardiovascular: S1, S2,no R/M/G  Pulmonary: CTA b/l. No wheezes/rales/rhonchi. Abdominal: Soft,NTND, + BS. Skin: No rash, nodules, or periungual changes. Neuro: Alert; able to carry normal conversation    Musculoskeletal:   Cervical & Lumbar Spine  Neck and spine have no noted deformities or signs of inflammation. Curvature of cervical, thoracic, and lumbar spine are within normal limits. Wrist, Hand, & Fingers  No bony deformities, inflammation, or tenderness of bony prominences. No anatomical snuff box tenderness; Full ROM in DIP, PIP, MCP, & carpal joints & with supination and pronation. Elbow  No bony deformities, inflammation, or tenderness in olecranon, medial, lateral epicondyle elbow. Full ROM upon flexion and extension. Shoulder  No bony deformities, inflammation, or tenderness in rotator cuff, biceps tendon, or acromioclavicular joint. Full ROM and strength in shoulder upon adduction, abduction, internal and external rotation. Hip  No bony deformities, inflammation, or tenderness in hip joint. Knee  FROM of the knee. NO swelling or warmth noted. No bony deformity noted    Ankle/toes  No bony deformities, inflammation or tenderness    DATA REVIEW    Prior medical records were reviewed and if applicable are summarized as below:    Labs:   12/2019: Quant gold, HBV, HCV negative, SSA/B, CCP negative  9/2019: ANCA, TSH, cbc, BMP, ACE. T. Pallidum, ESR, CRP, TPO, GOPI normal, RF positive    Imaging:   CT orbit (11/2019): normal    ASSESSMENT AND PLAN    A 62 y.o. female with hx of COPD, nonspecific ocular inflammation presents for a follow up visit. The patient's RF is positive but she has no features of sjogrens syndrome or rheumatoid arthritis.  Her eye issues are therefore idiopathic. She was started on methotrexate but she is not able to be abstinent from alcohol therefore I will switch her therapy. # NSOI:  - will start patient on azathioprine 200 mg oral daily. Will start with 100 mg daily for 2 weeks then increase to 200 mg oral daily. I explained the risks and benefits of azathioprine and answered all the questions in detail.   - will start her on prednisone 20 mg oral daily given she has some swelling around her eyes today. - I counseled the patient on the risk of avascular necrosis from corticosteroids. For each 20 mg of prednisone taken for over a month, the risk of AVN is 5%. It can also lead to weight gain, mood imbalances, osteoporosis, acne etc.   - can consider Humira in the future    # COPD:  - advised smoking cessation    # Medication Toxicity Monitoring:  - cbc, cmp next visit  - Hepatitis B, C: negative 12/2019  - Quant gold: negative 12/2019  - Immunizations: Discuss at next visit  - bone health: caltrate prescribed    RTC in 1 month     The patient voiced understanding of the aforementioned assessment and plan. Summary of plan was provided in the After Visit Summary patient instructions. I also provided education about MyChart setup and utility. Ms. Sarika Cuba has a reminder for a \"due or due soon\" health maintenance. I have asked that she contact her primary care provider for follow-up on this health maintenance. TODAY'S ORDERS    Orders Placed This Encounter    CBC WITH AUTOMATED DIFF    METABOLIC PANEL, COMPREHENSIVE    azaTHIOprine (IMURAN) 50 mg tablet    predniSONE (DELTASONE) 20 mg tablet    calcium carbonate-vitamin D3 (CALTRATE 600 PLUS D) 600 mg (1,500 mg)-800 unit chew       No future appointments.     Oumou aJmes MD    Adult Rheumatology   [facility]  A Part of [address]   Phone [phone]  Fax [phone]

## 2020-03-20 ENCOUNTER — TELEPHONE (OUTPATIENT)
Dept: RHEUMATOLOGY | Age: 60
End: 2020-03-20

## 2020-06-11 ENCOUNTER — TELEPHONE (OUTPATIENT)
Dept: RHEUMATOLOGY | Age: 60
End: 2020-06-11

## 2020-06-11 NOTE — TELEPHONE ENCOUNTER
I got a call from the patient's ophthalmologist today. 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 She had some imaging and there was concern for possible lymphoma but the ophthalmologist is not concerned about that at this time. He feels that she has ocular inflammation since she does respond very well to prednisone. Per the doctor's urging, the patient has agreed to take azathioprine again. At this time, we will give her 3 months on azathioprine. If she has no improvement on it, ophtho will consider biopsy before considering biologic therapy. I will have my office call the patient to make a f/u appointment.

## 2020-06-16 ENCOUNTER — OFFICE VISIT (OUTPATIENT)
Dept: RHEUMATOLOGY | Age: 60
End: 2020-06-16

## 2020-06-16 VITALS
HEART RATE: 80 BPM | WEIGHT: 242 LBS | DIASTOLIC BLOOD PRESSURE: 73 MMHG | BODY MASS INDEX: 33.88 KG/M2 | OXYGEN SATURATION: 98 % | RESPIRATION RATE: 20 BRPM | SYSTOLIC BLOOD PRESSURE: 112 MMHG | TEMPERATURE: 98.4 F | HEIGHT: 71 IN

## 2020-06-16 DIAGNOSIS — H20.9 UVEITIS: ICD-10-CM

## 2020-06-16 DIAGNOSIS — H05.10 ORBITAL INFLAMMATION, CHRONIC: ICD-10-CM

## 2020-06-16 DIAGNOSIS — Z79.60 LONG-TERM USE OF IMMUNOSUPPRESSANT MEDICATION: Primary | ICD-10-CM

## 2020-06-16 RX ORDER — ASPIRIN 325 MG
325 TABLET ORAL DAILY
COMMUNITY

## 2020-06-16 RX ORDER — AZATHIOPRINE 100 MG/1
TABLET ORAL
Qty: 60 TAB | Refills: 2 | Status: SHIPPED | OUTPATIENT
Start: 2020-06-16 | End: 2020-06-26 | Stop reason: ALTCHOICE

## 2020-06-16 NOTE — PROGRESS NOTES
REASON FOR VISIT    Patient presents for a follow up visit for    ICD-10-CM ICD-9-CM    1. Long-term use of immunosuppressant medication Z79.899 V58.69    2. 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 Uveitis H20.9 364.3        HISTORY OF DISEASE: Non specific orbital inflammation    Year of diagnosis: 2019  First visit with me: 12/2019  Cumulative disease manifestations: orbital inflammation  Positive serologies/labs: RF  Negative serologies/labs: Other co-morbidities: + smoking  Relapses:      Past treatment history:  Prednisone: Oral tapers multiple times  Non-biologic DMARD:  Methotrexate 15 mg oral weekly (1/2020-2/2020; dc/ed due to excessive alcohol use), Azathioprine 200 mg oral daily (2/2020-present;very noncompliant)  Biologic:  Other: folic acid daily    HISTORY OF PRESENT ILLNESS     Previous medical records reviewed and summarized: yes  Review of ophtho records: eye edema responded to oral steroids and the ophthalmologist suspects non specific orbital inflammation    The patient was seen by ophthalmologist and I spoke with him (see phone note). She did not restart the azathioprine. She doesn't feel good on it. She is not able to expand on that. She notes swelling at the side of her neck as well for 2.5 months.       REVIEW OF SYSTEMS    Positives as per history  Negatives as follows:  Derinda Marya:    Denies unexplained persistent fevers, weight change, chronic fatigue  HEAD/EYES:              Denies eye redness, blurry vision or sudden loss of vision, dry eyes, HA, temporal artery pain  ENT:                            Denies oral/nasal ulcers, recurrent sinus infections, dry mouth  RESPIRATORY:         No pleuritic pain, history of pleural effusions, hemoptysis, exertional dyspnea  CARDIOVASCULAR:             Denies chest pain, history of pericardial effusions  GASTRO:                    Denies heartburn, esophageal dysmotility, abdominal pain, nausea, vomiting, diarrhea, blood in the stool  HEMATOLOGIC:        No easy bruising, purpura, swollen lymph nodes  SKIN:                           Denies alopecia, ulcers, nodules, sun sensitivity, unexplained persistent rash   VASCULAR:                Denies edema, cyanosis, raynaud phenomenon  NEUROLOGIC:           Denies specific muscle weakness, paresthesias   PSYCHIATRIC:           No sleep disturbance / snoring, depression, anxiety  MSK:                           No morning stiffness >1 hour, SI joint pain, persistent joint swelling, persistent joint pain    PAST MEDICAL HISTORY    Past Medical History:   Diagnosis Date    COPD (chronic obstructive pulmonary disease) ([facility] [address]         History reviewed. No pertinent surgical history. FAMILY HISTORY    History reviewed. No pertinent family history. SOCIAL HISTORY    Social History     Tobacco Use    Smoking status: Current Every Day Smoker     Packs/day: 1.00     Types: Cigarettes    Smokeless tobacco: Never Used   Substance Use Topics    Alcohol use: No    Drug use: Not on file       MEDICATIONS    Current Outpatient Medications   Medication Sig Dispense Refill    aspirin (ASPIRIN) 325 mg tablet Take 325 mg by mouth daily.  azaTHIOprine 100 mg tab Take 1 pill daily for 14 days then increase to 2 pills daily 60 Tab 2    VENTOLIN HFA 90 mcg/actuation inhaler       azaTHIOprine (IMURAN) 50 mg tablet Take 2 Tabs by mouth daily. START 1 tab daily for 14 days then 2 tabs daily 60 Tab 3    predniSONE (DELTASONE) 20 mg tablet Take 20 mg by mouth daily. 30 Tab 0    calcium carbonate-vitamin D3 (CALTRATE 600 PLUS D) 600 mg (1,500 mg)-800 unit chew Take 2 Tabs by mouth daily. 180 Tab 1    omega 3-dha-epa-fish oil (FISH OIL) 100-160-1,000 mg cap Take  by mouth.  HYDROcodone-acetaminophen (NORCO) 5-325 mg per tablet Take 1 Tab by mouth every four (4) hours as needed for Pain. Max Daily Amount: 6 Tabs.  10 Tab 0    promethazine (PHENERGAN) 25 mg tablet Take 1 Tab by mouth every six (6) hours as needed. 12 Tab 0       ALLERGIES    No Known Allergies    PHYSICAL EXAMINATION    Visit Vitals  /73 (BP 1 Location: Right arm, BP Patient Position: Sitting)   Pulse 80   Temp 98.4 °F (36.9 °C) (Oral)   Resp 20   Ht 5' 11\" (1.803 m)   Wt 242 lb (109.8 kg)   SpO2 98%   BMI 33.75 kg/m²     Body mass index is 33.75 kg/m². General: NAD  HEENT: PERRL, anicteric, mild swelling noted on b/l eye lids. Lymphatic: No cervical or axillary lymphadenopathy. Cardiovascular: S1, S2,no R/M/G  Pulmonary: CTA b/l. No wheezes/rales/rhonchi. Abdominal: Soft,NTND, + BS. Skin: No rash, nodules, or periungual changes. Neuro: Alert; able to carry normal conversation    Musculoskeletal:   Cervical & Lumbar Spine  Neck and spine have no noted deformities or signs of inflammation. Curvature of cervical, thoracic, and lumbar spine are within normal limits. Wrist, Hand, & Fingers  No bony deformities, inflammation, or tenderness of bony prominences. No anatomical snuff box tenderness; Full ROM in DIP, PIP, MCP, & carpal joints & with supination and pronation. Elbow  No bony deformities, inflammation, or tenderness in olecranon, medial, lateral epicondyle elbow. Full ROM upon flexion and extension. Shoulder  No bony deformities, inflammation, or tenderness in rotator cuff, biceps tendon, or acromioclavicular joint. Full ROM and strength in shoulder upon adduction, abduction, internal and external rotation. Hip  No bony deformities, inflammation, or tenderness in hip joint. Knee  FROM of the knee. NO swelling or warmth noted. No bony deformity noted    Ankle/toes  No bony deformities, inflammation or tenderness    DATA REVIEW    Prior medical records were reviewed and if applicable are summarized as below:    Labs:   12/2019: Quant gold, HBV, HCV negative, SSA/B, CCP negative  9/2019: ANCA, TSH, cbc, BMP, ACE.  T. Pallidum, ESR, CRP, TPO, GOPI normal, RF positive    Imaging:   CT orbit (11/2019): normal    ASSESSMENT AND PLAN    A 62 y.o. female with hx of COPD, nonspecific ocular inflammation presents for a follow up visit. The patient's RF is positive but she has no features of sjogrens syndrome or rheumatoid arthritis. Her eye issues are therefore idiopathic. The patient is very non compliant and did not take azathioprine. I counseled her today extensively about the importance of re-starting her azathioprine. She noted she will start this today. # NSOI:  - Start on azathioprine 100 mg oral daily then increase to 200 mg in 2 weeks. Risks and benefits discussed previously  - if she needs biologic therapy, have discussion with ophtho as she might need biopsy prior to that since there was some concern for lymphoma (ophtho not worried about this). # COPD:  - advised smoking cessation    # Medication Toxicity Monitoring:  - cbc, cmp next visit  - Hepatitis B, C: negative 12/2019  - Quant gold: negative 12/2019  - Immunizations: Discuss at next visit  - bone health: caltrate prescribed    RTC in 3 months     The patient voiced understanding of the aforementioned assessment and plan. Summary of plan was provided in the After Visit Summary patient instructions. I also provided education about MyChart setup and utility. Ms. Luis Alfredo Paula has a reminder for a \"due or due soon\" health maintenance. I have asked that she contact her primary care provider for follow-up on this health maintenance. TODAY'S ORDERS    Orders Placed This Encounter    aspirin (ASPIRIN) 325 mg tablet    azaTHIOprine 100 mg tab       No future appointments.     Yas Monk MD    Adult Rheumatology   [facility]  A Part of [facility], [address]   Phone [phone]  Fax [phone]

## 2020-06-16 NOTE — PATIENT INSTRUCTIONS
Please fill out your [address] you will receive after your visit in the mail or via [address]

## 2020-06-16 NOTE — PROGRESS NOTES
Chief Complaint   Patient presents with    Other     optic inflammation     1. Have you been to the ER, urgent care clinic since your last visit? Hospitalized since your last visit? Yes Where: [facility] ED    2. Have you seen or consulted any other health care providers outside of the [address] [place] [address] since your last visit? Include any pap smears or colon screening.  No

## 2020-06-26 RX ORDER — AZATHIOPRINE 50 MG/1
TABLET ORAL
Qty: 120 TAB | Refills: 2 | Status: SHIPPED | OUTPATIENT
Start: 2020-06-26

## 2022-09-29 ENCOUNTER — APPOINTMENT (OUTPATIENT)
Dept: URBAN - METROPOLITAN AREA CLINIC 213 | Age: 62
Setting detail: DERMATOLOGY
End: 2022-09-29

## 2022-09-29 DIAGNOSIS — L81.4 OTHER MELANIN HYPERPIGMENTATION: ICD-10-CM

## 2022-09-29 DIAGNOSIS — L82.1 OTHER SEBORRHEIC KERATOSIS: ICD-10-CM

## 2022-09-29 DIAGNOSIS — L82.0 INFLAMED SEBORRHEIC KERATOSIS: ICD-10-CM

## 2022-09-29 DIAGNOSIS — D18.0 HEMANGIOMA: ICD-10-CM

## 2022-09-29 DIAGNOSIS — Z85.828 PERSONAL HISTORY OF OTHER MALIGNANT NEOPLASM OF SKIN: ICD-10-CM

## 2022-09-29 PROBLEM — D18.01 HEMANGIOMA OF SKIN AND SUBCUTANEOUS TISSUE: Status: ACTIVE | Noted: 2022-09-29

## 2022-09-29 PROBLEM — D48.5 NEOPLASM OF UNCERTAIN BEHAVIOR OF SKIN: Status: ACTIVE | Noted: 2022-09-29

## 2022-09-29 PROCEDURE — OTHER BIOPSY BY SHAVE METHOD: OTHER

## 2022-09-29 PROCEDURE — 11102 TANGNTL BX SKIN SINGLE LES: CPT | Mod: 59

## 2022-09-29 PROCEDURE — OTHER COUNSELING: OTHER

## 2022-09-29 PROCEDURE — OTHER LIQUID NITROGEN: OTHER

## 2022-09-29 PROCEDURE — 11103 TANGNTL BX SKIN EA SEP/ADDL: CPT | Mod: 59

## 2022-09-29 PROCEDURE — 99203 OFFICE O/P NEW LOW 30 MIN: CPT | Mod: 25

## 2022-09-29 PROCEDURE — 17110 DESTRUCT B9 LESION 1-14: CPT

## 2022-09-29 ASSESSMENT — LOCATION SIMPLE DESCRIPTION DERM
LOCATION SIMPLE: RIGHT BREAST
LOCATION SIMPLE: RIGHT UPPER BACK
LOCATION SIMPLE: CHEST
LOCATION SIMPLE: LEFT FOREARM

## 2022-09-29 ASSESSMENT — LOCATION ZONE DERM
LOCATION ZONE: ARM
LOCATION ZONE: TRUNK

## 2022-09-29 ASSESSMENT — LOCATION DETAILED DESCRIPTION DERM
LOCATION DETAILED: RIGHT SUPERIOR UPPER BACK
LOCATION DETAILED: RIGHT MEDIAL BREAST 2-3:00 REGION
LOCATION DETAILED: STERNUM
LOCATION DETAILED: LEFT PROXIMAL DORSAL FOREARM
LOCATION DETAILED: LEFT MEDIAL SUPERIOR CHEST

## 2022-09-29 NOTE — PROCEDURE: LIQUID NITROGEN
Medical Necessity Information: It is in your best interest to select a reason for this procedure from the list below. All of these items fulfill various CMS LCD requirements except the new and changing color options.
Render Note In Bullet Format When Appropriate: No
Show Aperture Variable?: Yes
Consent: The patient's consent was obtained including but not limited to risks of crusting, scabbing, blistering, scarring, darker or lighter pigmentary change, recurrence, incomplete removal and infection.
Spray Paint Text: The liquid nitrogen was applied to the skin utilizing a spray paint frosting technique.
Post-Care Instructions: I reviewed with the patient in detail post-care instructions. Patient is to wear sunprotection, and avoid picking at any of the treated lesions. Pt may apply Vaseline to crusted or scabbing areas.
Detail Level: Detailed
Medical Necessity Clause: This procedure was medically necessary because the lesions that were treated were:

## 2022-09-29 NOTE — PROCEDURE: BIOPSY BY SHAVE METHOD
Britt Valadez  : 1948  Referring Physician: Vipul Pike DO    Patient presents with:  Urinary Symptoms (urologic): patient presents for cystoscopy      CYSTOURETHROSCOPY    Anesthesia:  2% lidocaine gel    Urethra: Normal    Prostate / Pelvic Curettage Text: The wound bed was treated with curettage after the biopsy was performed.

## 2023-03-27 ENCOUNTER — APPOINTMENT (OUTPATIENT)
Dept: URBAN - METROPOLITAN AREA CLINIC 213 | Age: 63
Setting detail: DERMATOLOGY
End: 2023-03-27

## 2023-03-27 DIAGNOSIS — L90.5 SCAR CONDITIONS AND FIBROSIS OF SKIN: ICD-10-CM

## 2023-03-27 DIAGNOSIS — L82.0 INFLAMED SEBORRHEIC KERATOSIS: ICD-10-CM

## 2023-03-27 PROCEDURE — OTHER MIPS QUALITY: OTHER

## 2023-03-27 PROCEDURE — 17110 DESTRUCT B9 LESION 1-14: CPT

## 2023-03-27 PROCEDURE — OTHER COUNSELING: OTHER

## 2023-03-27 PROCEDURE — OTHER LIQUID NITROGEN: OTHER

## 2023-03-27 PROCEDURE — 99212 OFFICE O/P EST SF 10 MIN: CPT | Mod: 25

## 2023-03-27 ASSESSMENT — LOCATION DETAILED DESCRIPTION DERM
LOCATION DETAILED: LEFT PROXIMAL POSTERIOR UPPER ARM
LOCATION DETAILED: LEFT CENTRAL MALAR CHEEK
LOCATION DETAILED: LEFT PROXIMAL DORSAL FOREARM

## 2023-03-27 ASSESSMENT — LOCATION SIMPLE DESCRIPTION DERM
LOCATION SIMPLE: LEFT CHEEK
LOCATION SIMPLE: LEFT FOREARM
LOCATION SIMPLE: LEFT POSTERIOR UPPER ARM

## 2023-03-27 ASSESSMENT — LOCATION ZONE DERM
LOCATION ZONE: ARM
LOCATION ZONE: FACE

## 2023-03-27 NOTE — PROCEDURE: COUNSELING
Detail Level: Detailed
Patient Specific Counseling (Will Not Stick From Patient To Patient): No evidence of recurrence of dysplastic nevus from biopsy last year.

## 2023-03-27 NOTE — PROCEDURE: LIQUID NITROGEN
Render Post-Care Instructions In Note?: yes
Detail Level: Detailed
Post-Care Instructions: I reviewed with the patient in detail post-care instructions. Patient is to wear sunprotection, and avoid picking at any of the treated lesions. Pt may apply Vaseline to crusted or scabbing areas.
Medical Necessity Clause: This procedure was medically necessary because the lesions that were treated were:
Aperture Size (Optional): C
Consent: The patient's consent was obtained including but not limited to risks of crusting, scabbing, blistering, scarring, darker or lighter pigmentary change, recurrence, incomplete removal and infection.
Medical Necessity Information: It is in your best interest to select a reason for this procedure from the list below. All of these items fulfill various CMS LCD requirements except the new and changing color options.
Spray Paint Text: The liquid nitrogen was applied to the skin utilizing a spray paint frosting technique.
Spray Paint Technique: No
Number Of Freeze-Thaw Cycles: 1 freeze-thaw cycle
Duration Of Freeze Thaw-Cycle (Seconds): 10

## 2023-10-02 ENCOUNTER — APPOINTMENT (OUTPATIENT)
Dept: URBAN - METROPOLITAN AREA CLINIC 213 | Age: 63
Setting detail: DERMATOLOGY
End: 2023-10-02

## 2023-10-02 DIAGNOSIS — D18.0 HEMANGIOMA: ICD-10-CM

## 2023-10-02 DIAGNOSIS — Z85.828 PERSONAL HISTORY OF OTHER MALIGNANT NEOPLASM OF SKIN: ICD-10-CM

## 2023-10-02 DIAGNOSIS — L82.1 OTHER SEBORRHEIC KERATOSIS: ICD-10-CM

## 2023-10-02 DIAGNOSIS — L82.0 INFLAMED SEBORRHEIC KERATOSIS: ICD-10-CM

## 2023-10-02 DIAGNOSIS — L81.4 OTHER MELANIN HYPERPIGMENTATION: ICD-10-CM

## 2023-10-02 PROBLEM — D18.01 HEMANGIOMA OF SKIN AND SUBCUTANEOUS TISSUE: Status: ACTIVE | Noted: 2023-10-02

## 2023-10-02 PROCEDURE — OTHER MIPS QUALITY: OTHER

## 2023-10-02 PROCEDURE — 99213 OFFICE O/P EST LOW 20 MIN: CPT | Mod: 25

## 2023-10-02 PROCEDURE — OTHER COUNSELING: OTHER

## 2023-10-02 PROCEDURE — 17110 DESTRUCT B9 LESION 1-14: CPT

## 2023-10-02 PROCEDURE — OTHER LIQUID NITROGEN: OTHER

## 2023-10-02 ASSESSMENT — LOCATION ZONE DERM
LOCATION ZONE: TRUNK
LOCATION ZONE: ARM

## 2023-10-02 ASSESSMENT — LOCATION DETAILED DESCRIPTION DERM
LOCATION DETAILED: STERNUM
LOCATION DETAILED: LEFT PROXIMAL DORSAL FOREARM
LOCATION DETAILED: RIGHT MEDIAL BREAST 3-4:00 REGION
LOCATION DETAILED: RIGHT SUPERIOR UPPER BACK
LOCATION DETAILED: RIGHT MEDIAL BREAST 4-5:00 REGION
LOCATION DETAILED: LEFT MEDIAL SUPERIOR CHEST

## 2023-10-02 ASSESSMENT — LOCATION SIMPLE DESCRIPTION DERM
LOCATION SIMPLE: RIGHT UPPER BACK
LOCATION SIMPLE: LEFT FOREARM
LOCATION SIMPLE: CHEST
LOCATION SIMPLE: RIGHT BREAST

## 2023-10-02 NOTE — PROCEDURE: LIQUID NITROGEN
Medical Necessity Clause: This procedure was medically necessary because the lesions that were treated were:
Spray Paint Technique: No
Include Z78.9 (Other Specified Conditions Influencing Health Status) As An Associated Diagnosis?: Yes
Consent: The patient's consent was obtained including but not limited to risks of crusting, scabbing, blistering, scarring, darker or lighter pigmentary change, recurrence, incomplete removal and infection.
Medical Necessity Information: It is in your best interest to select a reason for this procedure from the list below. All of these items fulfill various CMS LCD requirements except the new and changing color options.
Spray Paint Text: The liquid nitrogen was applied to the skin utilizing a spray paint frosting technique.
Detail Level: Detailed
Post-Care Instructions: I reviewed with the patient in detail post-care instructions. Patient is to wear sunprotection, and avoid picking at any of the treated lesions. Pt may apply Vaseline to crusted or scabbing areas.

## 2024-05-24 ENCOUNTER — TELEPHONE (OUTPATIENT)
Age: 64
End: 2024-05-24

## 2024-12-05 ENCOUNTER — APPOINTMENT (OUTPATIENT)
Facility: HOSPITAL | Age: 64
DRG: 854 | End: 2024-12-05
Payer: MEDICARE

## 2024-12-05 ENCOUNTER — ANESTHESIA EVENT (OUTPATIENT)
Facility: HOSPITAL | Age: 64
End: 2024-12-05
Payer: MEDICARE

## 2024-12-05 ENCOUNTER — HOSPITAL ENCOUNTER (INPATIENT)
Facility: HOSPITAL | Age: 64
LOS: 4 days | Discharge: HOME | DRG: 854 | End: 2024-12-09
Attending: STUDENT IN AN ORGANIZED HEALTH CARE EDUCATION/TRAINING PROGRAM | Admitting: STUDENT IN AN ORGANIZED HEALTH CARE EDUCATION/TRAINING PROGRAM
Payer: MEDICARE

## 2024-12-05 ENCOUNTER — ANESTHESIA (OUTPATIENT)
Facility: HOSPITAL | Age: 64
End: 2024-12-05
Payer: MEDICARE

## 2024-12-05 DIAGNOSIS — N17.9 AKI (ACUTE KIDNEY INJURY) (HCC): ICD-10-CM

## 2024-12-05 DIAGNOSIS — A41.9 SEPSIS, DUE TO UNSPECIFIED ORGANISM, UNSPECIFIED WHETHER ACUTE ORGAN DYSFUNCTION PRESENT (HCC): ICD-10-CM

## 2024-12-05 DIAGNOSIS — N30.01 ACUTE CYSTITIS WITH HEMATURIA: ICD-10-CM

## 2024-12-05 DIAGNOSIS — N20.0 KIDNEY STONE: Primary | ICD-10-CM

## 2024-12-05 DIAGNOSIS — I48.91 ATRIAL FIBRILLATION, UNSPECIFIED TYPE (HCC): ICD-10-CM

## 2024-12-05 PROBLEM — Z98.890 H/O EYE SURGERY: Status: ACTIVE | Noted: 2024-08-13

## 2024-12-05 PROBLEM — H26.9 CATARACT: Status: ACTIVE | Noted: 2024-07-02

## 2024-12-05 PROBLEM — N23 URETERAL COLIC: Status: ACTIVE | Noted: 2024-12-05

## 2024-12-05 LAB
ALBUMIN SERPL-MCNC: 2.8 G/DL (ref 3.5–5)
ALBUMIN/GLOB SERPL: 0.6 (ref 1.1–2.2)
ALP SERPL-CCNC: 70 U/L (ref 45–117)
ALT SERPL-CCNC: 17 U/L (ref 12–78)
ANION GAP SERPL CALC-SCNC: 5 MMOL/L (ref 2–12)
APPEARANCE UR: ABNORMAL
AST SERPL-CCNC: 28 U/L (ref 15–37)
BACTERIA URNS QL MICRO: ABNORMAL /HPF
BASOPHILS # BLD: 0 K/UL (ref 0–0.1)
BASOPHILS NFR BLD: 0 % (ref 0–1)
BILIRUB SERPL-MCNC: 1 MG/DL (ref 0.2–1)
BILIRUB UR QL: NEGATIVE
BUN SERPL-MCNC: 18 MG/DL (ref 6–20)
BUN/CREAT SERPL: 7 (ref 12–20)
CALCIUM SERPL-MCNC: 9.3 MG/DL (ref 8.5–10.1)
CHLORIDE SERPL-SCNC: 106 MMOL/L (ref 97–108)
CO2 SERPL-SCNC: 26 MMOL/L (ref 21–32)
COLOR UR: ABNORMAL
COMMENT:: NORMAL
CREAT SERPL-MCNC: 2.66 MG/DL (ref 0.55–1.02)
DIFFERENTIAL METHOD BLD: ABNORMAL
EKG ATRIAL RATE: 113 BPM
EKG DIAGNOSIS: NORMAL
EKG P AXIS: 83 DEGREES
EKG P-R INTERVAL: 122 MS
EKG Q-T INTERVAL: 302 MS
EKG QRS DURATION: 92 MS
EKG QTC CALCULATION (BAZETT): 414 MS
EKG R AXIS: 93 DEGREES
EKG T AXIS: 43 DEGREES
EKG VENTRICULAR RATE: 113 BPM
EOSINOPHIL # BLD: 0 K/UL (ref 0–0.4)
EOSINOPHIL NFR BLD: 0 % (ref 0–7)
EPITH CASTS URNS QL MICRO: ABNORMAL /LPF
ERYTHROCYTE [DISTWIDTH] IN BLOOD BY AUTOMATED COUNT: 17.2 % (ref 11.5–14.5)
EST. AVERAGE GLUCOSE BLD GHB EST-MCNC: 114 MG/DL
FLUAV RNA SPEC QL NAA+PROBE: NOT DETECTED
FLUBV RNA SPEC QL NAA+PROBE: NOT DETECTED
GLOBULIN SER CALC-MCNC: 4.4 G/DL (ref 2–4)
GLUCOSE SERPL-MCNC: 140 MG/DL (ref 65–100)
GLUCOSE UR STRIP.AUTO-MCNC: NEGATIVE MG/DL
HBA1C MFR BLD: 5.6 % (ref 4–5.6)
HCT VFR BLD AUTO: 39.8 % (ref 35–47)
HGB BLD-MCNC: 12.6 G/DL (ref 11.5–16)
HGB UR QL STRIP: ABNORMAL
IMM GRANULOCYTES # BLD AUTO: 0.2 K/UL (ref 0–0.04)
IMM GRANULOCYTES NFR BLD AUTO: 1 % (ref 0–0.5)
KETONES UR QL STRIP.AUTO: ABNORMAL MG/DL
LACTATE BLD-SCNC: 1.83 MMOL/L (ref 0.4–2)
LEUKOCYTE ESTERASE UR QL STRIP.AUTO: ABNORMAL
LIPASE SERPL-CCNC: 21 U/L (ref 13–75)
LYMPHOCYTES # BLD: 0.5 K/UL (ref 0.8–3.5)
LYMPHOCYTES NFR BLD: 2 % (ref 12–49)
MCH RBC QN AUTO: 25.8 PG (ref 26–34)
MCHC RBC AUTO-ENTMCNC: 31.7 G/DL (ref 30–36.5)
MCV RBC AUTO: 81.4 FL (ref 80–99)
MONOCYTES # BLD: 0.9 K/UL (ref 0–1)
MONOCYTES NFR BLD: 4 % (ref 5–13)
NEUTS SEG # BLD: 21.3 K/UL (ref 1.8–8)
NEUTS SEG NFR BLD: 93 % (ref 32–75)
NITRITE UR QL STRIP.AUTO: NEGATIVE
NRBC # BLD: 0 K/UL (ref 0–0.01)
NRBC BLD-RTO: 0 PER 100 WBC
PH UR STRIP: 7.5 (ref 5–8)
PLATELET # BLD AUTO: 271 K/UL (ref 150–400)
PMV BLD AUTO: 9.7 FL (ref 8.9–12.9)
POTASSIUM SERPL-SCNC: 3.6 MMOL/L (ref 3.5–5.1)
PROT SERPL-MCNC: 7.2 G/DL (ref 6.4–8.2)
PROT UR STRIP-MCNC: 300 MG/DL
RBC # BLD AUTO: 4.89 M/UL (ref 3.8–5.2)
RBC #/AREA URNS HPF: ABNORMAL /HPF (ref 0–5)
RBC MORPH BLD: ABNORMAL
RBC MORPH BLD: ABNORMAL
SARS-COV-2 RNA RESP QL NAA+PROBE: NOT DETECTED
SODIUM SERPL-SCNC: 137 MMOL/L (ref 136–145)
SOURCE: NORMAL
SP GR UR REFRACTOMETRY: 1.02
SPECIMEN HOLD: NORMAL
URINE CULTURE IF INDICATED: ABNORMAL
UROBILINOGEN UR QL STRIP.AUTO: 1 EU/DL (ref 0.2–1)
WBC # BLD AUTO: 22.9 K/UL (ref 3.6–11)
WBC URNS QL MICRO: >100 /HPF (ref 0–4)

## 2024-12-05 PROCEDURE — 3600000013 HC SURGERY LEVEL 3 ADDTL 15MIN: Performed by: STUDENT IN AN ORGANIZED HEALTH CARE EDUCATION/TRAINING PROGRAM

## 2024-12-05 PROCEDURE — 83605 ASSAY OF LACTIC ACID: CPT

## 2024-12-05 PROCEDURE — 87086 URINE CULTURE/COLONY COUNT: CPT

## 2024-12-05 PROCEDURE — 83036 HEMOGLOBIN GLYCOSYLATED A1C: CPT

## 2024-12-05 PROCEDURE — 96360 HYDRATION IV INFUSION INIT: CPT

## 2024-12-05 PROCEDURE — 6360000002 HC RX W HCPCS: Performed by: STUDENT IN AN ORGANIZED HEALTH CARE EDUCATION/TRAINING PROGRAM

## 2024-12-05 PROCEDURE — C2617 STENT, NON-COR, TEM W/O DEL: HCPCS | Performed by: STUDENT IN AN ORGANIZED HEALTH CARE EDUCATION/TRAINING PROGRAM

## 2024-12-05 PROCEDURE — 87186 SC STD MICRODIL/AGAR DIL: CPT

## 2024-12-05 PROCEDURE — 87154 CUL TYP ID BLD PTHGN 6+ TRGT: CPT

## 2024-12-05 PROCEDURE — 2580000003 HC RX 258: Performed by: STUDENT IN AN ORGANIZED HEALTH CARE EDUCATION/TRAINING PROGRAM

## 2024-12-05 PROCEDURE — C1758 CATHETER, URETERAL: HCPCS | Performed by: STUDENT IN AN ORGANIZED HEALTH CARE EDUCATION/TRAINING PROGRAM

## 2024-12-05 PROCEDURE — 3700000001 HC ADD 15 MINUTES (ANESTHESIA): Performed by: STUDENT IN AN ORGANIZED HEALTH CARE EDUCATION/TRAINING PROGRAM

## 2024-12-05 PROCEDURE — 3700000000 HC ANESTHESIA ATTENDED CARE: Performed by: STUDENT IN AN ORGANIZED HEALTH CARE EDUCATION/TRAINING PROGRAM

## 2024-12-05 PROCEDURE — 2709999900 HC NON-CHARGEABLE SUPPLY: Performed by: STUDENT IN AN ORGANIZED HEALTH CARE EDUCATION/TRAINING PROGRAM

## 2024-12-05 PROCEDURE — 3600000003 HC SURGERY LEVEL 3 BASE: Performed by: STUDENT IN AN ORGANIZED HEALTH CARE EDUCATION/TRAINING PROGRAM

## 2024-12-05 PROCEDURE — 87636 SARSCOV2 & INF A&B AMP PRB: CPT

## 2024-12-05 PROCEDURE — 36415 COLL VENOUS BLD VENIPUNCTURE: CPT

## 2024-12-05 PROCEDURE — 87077 CULTURE AEROBIC IDENTIFY: CPT

## 2024-12-05 PROCEDURE — 93005 ELECTROCARDIOGRAM TRACING: CPT | Performed by: STUDENT IN AN ORGANIZED HEALTH CARE EDUCATION/TRAINING PROGRAM

## 2024-12-05 PROCEDURE — 83690 ASSAY OF LIPASE: CPT

## 2024-12-05 PROCEDURE — 80053 COMPREHEN METABOLIC PANEL: CPT

## 2024-12-05 PROCEDURE — 6370000000 HC RX 637 (ALT 250 FOR IP): Performed by: STUDENT IN AN ORGANIZED HEALTH CARE EDUCATION/TRAINING PROGRAM

## 2024-12-05 PROCEDURE — 0T778DZ DILATION OF LEFT URETER WITH INTRALUMINAL DEVICE, VIA NATURAL OR ARTIFICIAL OPENING ENDOSCOPIC: ICD-10-PCS | Performed by: STUDENT IN AN ORGANIZED HEALTH CARE EDUCATION/TRAINING PROGRAM

## 2024-12-05 PROCEDURE — 81001 URINALYSIS AUTO W/SCOPE: CPT

## 2024-12-05 PROCEDURE — 94640 AIRWAY INHALATION TREATMENT: CPT

## 2024-12-05 PROCEDURE — 7100000000 HC PACU RECOVERY - FIRST 15 MIN: Performed by: STUDENT IN AN ORGANIZED HEALTH CARE EDUCATION/TRAINING PROGRAM

## 2024-12-05 PROCEDURE — 87040 BLOOD CULTURE FOR BACTERIA: CPT

## 2024-12-05 PROCEDURE — C1769 GUIDE WIRE: HCPCS | Performed by: STUDENT IN AN ORGANIZED HEALTH CARE EDUCATION/TRAINING PROGRAM

## 2024-12-05 PROCEDURE — 2580000003 HC RX 258: Performed by: NURSE ANESTHETIST, CERTIFIED REGISTERED

## 2024-12-05 PROCEDURE — 87088 URINE BACTERIA CULTURE: CPT

## 2024-12-05 PROCEDURE — 7100000001 HC PACU RECOVERY - ADDTL 15 MIN: Performed by: STUDENT IN AN ORGANIZED HEALTH CARE EDUCATION/TRAINING PROGRAM

## 2024-12-05 PROCEDURE — 99285 EMERGENCY DEPT VISIT HI MDM: CPT

## 2024-12-05 PROCEDURE — 71250 CT THORAX DX C-: CPT

## 2024-12-05 PROCEDURE — 6360000002 HC RX W HCPCS: Performed by: NURSE ANESTHETIST, CERTIFIED REGISTERED

## 2024-12-05 PROCEDURE — 1100000003 HC PRIVATE W/ TELEMETRY

## 2024-12-05 PROCEDURE — 85025 COMPLETE CBC W/AUTO DIFF WBC: CPT

## 2024-12-05 PROCEDURE — 2500000003 HC RX 250 WO HCPCS: Performed by: STUDENT IN AN ORGANIZED HEALTH CARE EDUCATION/TRAINING PROGRAM

## 2024-12-05 DEVICE — URETERAL STENT
Type: IMPLANTABLE DEVICE | Site: URETER | Status: FUNCTIONAL
Brand: POLARIS™ ULTRA

## 2024-12-05 RX ORDER — SODIUM CHLORIDE, SODIUM LACTATE, POTASSIUM CHLORIDE, CALCIUM CHLORIDE 600; 310; 30; 20 MG/100ML; MG/100ML; MG/100ML; MG/100ML
INJECTION, SOLUTION INTRAVENOUS
Status: DISCONTINUED | OUTPATIENT
Start: 2024-12-05 | End: 2024-12-05 | Stop reason: SDUPTHER

## 2024-12-05 RX ORDER — NALOXONE HYDROCHLORIDE 0.4 MG/ML
INJECTION, SOLUTION INTRAMUSCULAR; INTRAVENOUS; SUBCUTANEOUS PRN
Status: DISCONTINUED | OUTPATIENT
Start: 2024-12-05 | End: 2024-12-05 | Stop reason: HOSPADM

## 2024-12-05 RX ORDER — SODIUM CHLORIDE 0.9 % (FLUSH) 0.9 %
5-40 SYRINGE (ML) INJECTION EVERY 12 HOURS SCHEDULED
Status: DISCONTINUED | OUTPATIENT
Start: 2024-12-05 | End: 2024-12-09 | Stop reason: HOSPADM

## 2024-12-05 RX ORDER — HYDROMORPHONE HYDROCHLORIDE 1 MG/ML
0.5 INJECTION, SOLUTION INTRAMUSCULAR; INTRAVENOUS; SUBCUTANEOUS EVERY 4 HOURS PRN
Status: DISCONTINUED | OUTPATIENT
Start: 2024-12-05 | End: 2024-12-06

## 2024-12-05 RX ORDER — POLYETHYLENE GLYCOL 3350 17 G
2 POWDER IN PACKET (EA) ORAL
Status: DISCONTINUED | OUTPATIENT
Start: 2024-12-05 | End: 2024-12-09 | Stop reason: HOSPADM

## 2024-12-05 RX ORDER — ONDANSETRON 2 MG/ML
4 INJECTION INTRAMUSCULAR; INTRAVENOUS
Status: DISCONTINUED | OUTPATIENT
Start: 2024-12-05 | End: 2024-12-05 | Stop reason: HOSPADM

## 2024-12-05 RX ORDER — HYDROMORPHONE HYDROCHLORIDE 1 MG/ML
1 INJECTION, SOLUTION INTRAMUSCULAR; INTRAVENOUS; SUBCUTANEOUS EVERY 4 HOURS PRN
Status: DISCONTINUED | OUTPATIENT
Start: 2024-12-05 | End: 2024-12-09 | Stop reason: HOSPADM

## 2024-12-05 RX ORDER — ONDANSETRON 2 MG/ML
4 INJECTION INTRAMUSCULAR; INTRAVENOUS EVERY 6 HOURS PRN
Status: DISCONTINUED | OUTPATIENT
Start: 2024-12-05 | End: 2024-12-05

## 2024-12-05 RX ORDER — ASPIRIN 81 MG/1
81 TABLET, CHEWABLE ORAL DAILY
Status: ON HOLD | COMMUNITY
End: 2024-12-09 | Stop reason: HOSPADM

## 2024-12-05 RX ORDER — SODIUM CHLORIDE 9 MG/ML
INJECTION, SOLUTION INTRAVENOUS CONTINUOUS
Status: DISCONTINUED | OUTPATIENT
Start: 2024-12-05 | End: 2024-12-06

## 2024-12-05 RX ORDER — POLYETHYLENE GLYCOL 3350 17 G/17G
17 POWDER, FOR SOLUTION ORAL DAILY PRN
Status: DISCONTINUED | OUTPATIENT
Start: 2024-12-05 | End: 2024-12-09 | Stop reason: HOSPADM

## 2024-12-05 RX ORDER — MEPERIDINE HYDROCHLORIDE 25 MG/ML
12.5 INJECTION INTRAMUSCULAR; INTRAVENOUS; SUBCUTANEOUS EVERY 5 MIN PRN
Status: DISCONTINUED | OUTPATIENT
Start: 2024-12-05 | End: 2024-12-05 | Stop reason: HOSPADM

## 2024-12-05 RX ORDER — ACETAMINOPHEN 650 MG/1
650 SUPPOSITORY RECTAL EVERY 6 HOURS PRN
Status: DISCONTINUED | OUTPATIENT
Start: 2024-12-05 | End: 2024-12-09 | Stop reason: HOSPADM

## 2024-12-05 RX ORDER — SODIUM CHLORIDE 0.9 % (FLUSH) 0.9 %
5-40 SYRINGE (ML) INJECTION PRN
Status: DISCONTINUED | OUTPATIENT
Start: 2024-12-05 | End: 2024-12-09 | Stop reason: HOSPADM

## 2024-12-05 RX ORDER — ONDANSETRON 2 MG/ML
INJECTION INTRAMUSCULAR; INTRAVENOUS
Status: DISCONTINUED | OUTPATIENT
Start: 2024-12-05 | End: 2024-12-05 | Stop reason: SDUPTHER

## 2024-12-05 RX ORDER — HYDROMORPHONE HYDROCHLORIDE 1 MG/ML
0.5 INJECTION, SOLUTION INTRAMUSCULAR; INTRAVENOUS; SUBCUTANEOUS EVERY 5 MIN PRN
Status: DISCONTINUED | OUTPATIENT
Start: 2024-12-05 | End: 2024-12-05 | Stop reason: HOSPADM

## 2024-12-05 RX ORDER — NICOTINE 21 MG/24HR
1 PATCH, TRANSDERMAL 24 HOURS TRANSDERMAL DAILY
Status: DISCONTINUED | OUTPATIENT
Start: 2024-12-05 | End: 2024-12-09 | Stop reason: HOSPADM

## 2024-12-05 RX ORDER — IPRATROPIUM BROMIDE AND ALBUTEROL SULFATE 2.5; .5 MG/3ML; MG/3ML
1 SOLUTION RESPIRATORY (INHALATION)
Status: DISCONTINUED | OUTPATIENT
Start: 2024-12-05 | End: 2024-12-05 | Stop reason: HOSPADM

## 2024-12-05 RX ORDER — IPRATROPIUM BROMIDE AND ALBUTEROL SULFATE 2.5; .5 MG/3ML; MG/3ML
1 SOLUTION RESPIRATORY (INHALATION) EVERY 6 HOURS PRN
COMMUNITY
Start: 2024-08-17

## 2024-12-05 RX ORDER — DEXAMETHASONE SODIUM PHOSPHATE 4 MG/ML
INJECTION, SOLUTION INTRA-ARTICULAR; INTRALESIONAL; INTRAMUSCULAR; INTRAVENOUS; SOFT TISSUE
Status: DISCONTINUED | OUTPATIENT
Start: 2024-12-05 | End: 2024-12-05 | Stop reason: SDUPTHER

## 2024-12-05 RX ORDER — ALBUTEROL SULFATE 90 UG/1
2 INHALANT RESPIRATORY (INHALATION) EVERY 4 HOURS PRN
COMMUNITY

## 2024-12-05 RX ORDER — SODIUM CHLORIDE 9 MG/ML
INJECTION, SOLUTION INTRAVENOUS PRN
Status: DISCONTINUED | OUTPATIENT
Start: 2024-12-05 | End: 2024-12-09 | Stop reason: HOSPADM

## 2024-12-05 RX ORDER — PROCHLORPERAZINE EDISYLATE 5 MG/ML
5 INJECTION INTRAMUSCULAR; INTRAVENOUS
Status: DISCONTINUED | OUTPATIENT
Start: 2024-12-05 | End: 2024-12-05 | Stop reason: HOSPADM

## 2024-12-05 RX ORDER — ONDANSETRON 4 MG/1
4 TABLET, ORALLY DISINTEGRATING ORAL EVERY 8 HOURS PRN
Status: DISCONTINUED | OUTPATIENT
Start: 2024-12-05 | End: 2024-12-09 | Stop reason: HOSPADM

## 2024-12-05 RX ORDER — FENTANYL CITRATE 50 UG/ML
50 INJECTION, SOLUTION INTRAMUSCULAR; INTRAVENOUS EVERY 5 MIN PRN
Status: DISCONTINUED | OUTPATIENT
Start: 2024-12-05 | End: 2024-12-05 | Stop reason: HOSPADM

## 2024-12-05 RX ORDER — ACETAMINOPHEN 500 MG
1000 TABLET ORAL
Status: DISCONTINUED | OUTPATIENT
Start: 2024-12-05 | End: 2024-12-05 | Stop reason: HOSPADM

## 2024-12-05 RX ORDER — SODIUM CHLORIDE 9 MG/ML
INJECTION, SOLUTION INTRAVENOUS PRN
Status: DISCONTINUED | OUTPATIENT
Start: 2024-12-05 | End: 2024-12-05 | Stop reason: HOSPADM

## 2024-12-05 RX ORDER — SODIUM CHLORIDE 0.9 % (FLUSH) 0.9 %
5-40 SYRINGE (ML) INJECTION PRN
Status: DISCONTINUED | OUTPATIENT
Start: 2024-12-05 | End: 2024-12-05 | Stop reason: HOSPADM

## 2024-12-05 RX ORDER — IPRATROPIUM BROMIDE AND ALBUTEROL SULFATE 2.5; .5 MG/3ML; MG/3ML
1 SOLUTION RESPIRATORY (INHALATION) EVERY 4 HOURS PRN
Status: DISCONTINUED | OUTPATIENT
Start: 2024-12-05 | End: 2024-12-09 | Stop reason: HOSPADM

## 2024-12-05 RX ORDER — PHENYLEPHRINE HCL IN 0.9% NACL 0.4MG/10ML
SYRINGE (ML) INTRAVENOUS
Status: DISCONTINUED | OUTPATIENT
Start: 2024-12-05 | End: 2024-12-05 | Stop reason: SDUPTHER

## 2024-12-05 RX ORDER — ACETAMINOPHEN 325 MG/1
650 TABLET ORAL EVERY 6 HOURS PRN
Status: DISCONTINUED | OUTPATIENT
Start: 2024-12-05 | End: 2024-12-09 | Stop reason: HOSPADM

## 2024-12-05 RX ORDER — ONDANSETRON 2 MG/ML
4 INJECTION INTRAMUSCULAR; INTRAVENOUS EVERY 6 HOURS PRN
Status: DISCONTINUED | OUTPATIENT
Start: 2024-12-05 | End: 2024-12-09 | Stop reason: HOSPADM

## 2024-12-05 RX ORDER — ACETAMINOPHEN 325 MG/1
650 TABLET ORAL EVERY 6 HOURS PRN
Status: DISCONTINUED | OUTPATIENT
Start: 2024-12-05 | End: 2024-12-05

## 2024-12-05 RX ORDER — SUCCINYLCHOLINE CHLORIDE 20 MG/ML
INJECTION INTRAMUSCULAR; INTRAVENOUS
Status: DISCONTINUED | OUTPATIENT
Start: 2024-12-05 | End: 2024-12-05 | Stop reason: SDUPTHER

## 2024-12-05 RX ORDER — 0.9 % SODIUM CHLORIDE 0.9 %
1000 INTRAVENOUS SOLUTION INTRAVENOUS ONCE
Status: COMPLETED | OUTPATIENT
Start: 2024-12-05 | End: 2024-12-05

## 2024-12-05 RX ORDER — IOPAMIDOL 755 MG/ML
100 INJECTION, SOLUTION INTRAVASCULAR
Status: DISCONTINUED | OUTPATIENT
Start: 2024-12-05 | End: 2024-12-05

## 2024-12-05 RX ORDER — FENTANYL CITRATE 50 UG/ML
INJECTION, SOLUTION INTRAMUSCULAR; INTRAVENOUS
Status: DISCONTINUED | OUTPATIENT
Start: 2024-12-05 | End: 2024-12-05 | Stop reason: SDUPTHER

## 2024-12-05 RX ORDER — SODIUM CHLORIDE 0.9 % (FLUSH) 0.9 %
5-40 SYRINGE (ML) INJECTION EVERY 12 HOURS SCHEDULED
Status: DISCONTINUED | OUTPATIENT
Start: 2024-12-05 | End: 2024-12-05 | Stop reason: HOSPADM

## 2024-12-05 RX ORDER — LIDOCAINE HYDROCHLORIDE 20 MG/ML
INJECTION, SOLUTION EPIDURAL; INFILTRATION; INTRACAUDAL; PERINEURAL
Status: DISCONTINUED | OUTPATIENT
Start: 2024-12-05 | End: 2024-12-05 | Stop reason: SDUPTHER

## 2024-12-05 RX ORDER — ASPIRIN 81 MG/1
81 TABLET, CHEWABLE ORAL DAILY
Status: DISCONTINUED | OUTPATIENT
Start: 2024-12-06 | End: 2024-12-09 | Stop reason: HOSPADM

## 2024-12-05 RX ADMIN — ACETAMINOPHEN 1000 MG: 500 TABLET ORAL at 12:19

## 2024-12-05 RX ADMIN — LIDOCAINE HYDROCHLORIDE 100 MG: 20 INJECTION, SOLUTION EPIDURAL; INFILTRATION; INTRACAUDAL; PERINEURAL at 20:58

## 2024-12-05 RX ADMIN — ACETAMINOPHEN 650 MG: 325 TABLET ORAL at 18:20

## 2024-12-05 RX ADMIN — Medication 80 MCG: at 21:18

## 2024-12-05 RX ADMIN — Medication 120 MCG: at 21:10

## 2024-12-05 RX ADMIN — Medication 80 MCG: at 21:03

## 2024-12-05 RX ADMIN — PROPOFOL 150 MG: 10 INJECTION, EMULSION INTRAVENOUS at 21:03

## 2024-12-05 RX ADMIN — SODIUM CHLORIDE: 9 INJECTION, SOLUTION INTRAVENOUS at 15:47

## 2024-12-05 RX ADMIN — ONDANSETRON HYDROCHLORIDE 4 MG: 2 INJECTION, SOLUTION INTRAMUSCULAR; INTRAVENOUS at 21:32

## 2024-12-05 RX ADMIN — PHENYLEPHRINE HYDROCHLORIDE 20 MCG/MIN: 10 INJECTION INTRAVENOUS at 21:12

## 2024-12-05 RX ADMIN — HYDROMORPHONE HYDROCHLORIDE 1 MG: 1 INJECTION, SOLUTION INTRAMUSCULAR; INTRAVENOUS; SUBCUTANEOUS at 15:18

## 2024-12-05 RX ADMIN — SODIUM CHLORIDE 1000 ML: 9 INJECTION, SOLUTION INTRAVENOUS at 12:20

## 2024-12-05 RX ADMIN — SODIUM CHLORIDE, POTASSIUM CHLORIDE, SODIUM LACTATE AND CALCIUM CHLORIDE: 600; 310; 30; 20 INJECTION, SOLUTION INTRAVENOUS at 20:54

## 2024-12-05 RX ADMIN — FENTANYL CITRATE 50 MCG: 50 INJECTION, SOLUTION INTRAMUSCULAR; INTRAVENOUS at 20:58

## 2024-12-05 RX ADMIN — IPRATROPIUM BROMIDE AND ALBUTEROL SULFATE 1 DOSE: 2.5; .5 SOLUTION RESPIRATORY (INHALATION) at 18:59

## 2024-12-05 RX ADMIN — DEXAMETHASONE SODIUM PHOSPHATE 8 MG: 4 INJECTION, SOLUTION INTRAMUSCULAR; INTRAVENOUS at 21:07

## 2024-12-05 RX ADMIN — HYDROMORPHONE HYDROCHLORIDE 1 MG: 1 INJECTION, SOLUTION INTRAMUSCULAR; INTRAVENOUS; SUBCUTANEOUS at 22:41

## 2024-12-05 RX ADMIN — FENTANYL CITRATE 50 MCG: 50 INJECTION, SOLUTION INTRAMUSCULAR; INTRAVENOUS at 21:10

## 2024-12-05 RX ADMIN — IPRATROPIUM BROMIDE AND ALBUTEROL SULFATE 1 DOSE: .5; 3 SOLUTION RESPIRATORY (INHALATION) at 12:09

## 2024-12-05 RX ADMIN — SUCCINYLCHOLINE CHLORIDE 140 MG: 20 INJECTION, SOLUTION INTRAMUSCULAR; INTRAVENOUS at 21:03

## 2024-12-05 RX ADMIN — Medication 120 MCG: at 21:12

## 2024-12-05 RX ADMIN — Medication 120 MCG: at 21:17

## 2024-12-05 RX ADMIN — WATER 2000 MG: 1 INJECTION INTRAMUSCULAR; INTRAVENOUS; SUBCUTANEOUS at 13:44

## 2024-12-05 ASSESSMENT — PAIN SCALES - GENERAL
PAINLEVEL_OUTOF10: 8
PAINLEVEL_OUTOF10: 8
PAINLEVEL_OUTOF10: 4
PAINLEVEL_OUTOF10: 8
PAINLEVEL_OUTOF10: 8
PAINLEVEL_OUTOF10: 9

## 2024-12-05 ASSESSMENT — PAIN DESCRIPTION - LOCATION
LOCATION: ABDOMEN
LOCATION: ABDOMEN;BACK
LOCATION: ABDOMEN;BACK

## 2024-12-05 ASSESSMENT — LIFESTYLE VARIABLES
HOW MANY STANDARD DRINKS CONTAINING ALCOHOL DO YOU HAVE ON A TYPICAL DAY: PATIENT DOES NOT DRINK
HOW OFTEN DO YOU HAVE A DRINK CONTAINING ALCOHOL: NEVER
SMOKING_STATUS: 1

## 2024-12-05 ASSESSMENT — COPD QUESTIONNAIRES: CAT_SEVERITY: MODERATE

## 2024-12-05 ASSESSMENT — PAIN DESCRIPTION - ORIENTATION: ORIENTATION: LEFT

## 2024-12-05 ASSESSMENT — PAIN DESCRIPTION - DESCRIPTORS: DESCRIPTORS: SHARP;ACHING

## 2024-12-05 NOTE — ED NOTES
TRANSFER - OUT REPORT:    Verbal report given to Mahogany on Kendy Chinchilla  being transferred to [facility] for routine progression of patient care       Report consisted of patient's Situation, Background, Assessment and   Recommendations(SBAR).     Information from the following report(s) Nurse Handoff Report, Index, ED Encounter Summary, ED SBAR, Adult Overview, Intake/Output, MAR, Recent Results, Med Rec Status, and Cardiac Rhythm sinus tach  was reviewed with the receiving nurse.    [place] Fall Assessment:    Presents to emergency department  because of falls (Syncope, seizure, or loss of consciousness): No  Age > 70: No  Altered Mental Status, Intoxication with alcohol or substance confusion (Disorientation, impaired judgment, poor safety awaremess, or inability to follow instructions): No  Impaired Mobility: Ambulates or transfers with assistive devices or assistance; Unable to ambulate or transer.: No  Nursing Judgement: No          Lines:   Peripheral IV 12/05/24 Left Antecubital (Active)        Opportunity for questions and clarification was provided.      Patient transported with:  Monitor and Tech

## 2024-12-05 NOTE — ED PROVIDER NOTES
Range    Source Nasopharyngeal      SARS-CoV-2, PCR Not detected NOTD      Rapid Influenza A By PCR Not detected NOTD      Rapid Influenza B By PCR Not detected NOTD     POC Lactic Acid    Collection Time: 12/05/24  1:00 PM   Result Value Ref Range    POC Lactic Acid 1.83 0.40 - 2.00 mmol/L       EKG: If performed, independent interpretation documented below in the MDM section     RADIOLOGY:  Non-plain film images such as CT, Ultrasound and MRI are read by the radiologist. Plain radiographic images are visualized and preliminarily interpreted by the ED Provider with the findings documented in the MDM section.     Interpretation per the Radiologist below, if available at the time of this note:     CT CHEST ABDOMEN PELVIS WO CONTRAST Additional Contrast? 1   Final Result      1. 2 mm obstructing calculus at the left ureterovesical junction, causing mild   left-sided hydronephrosis and hydroureter.   2. 1.1 cm nodule in the right lower lobe is increased since September 2018.         Electronically signed by SAROJ GAINES           PROCEDURES   Unless otherwise noted below, none  Procedures     CRITICAL CARE TIME   None    EMERGENCY DEPARTMENT COURSE and DIFFERENTIAL DIAGNOSIS/MDM   Vitals:    Vitals:    12/05/24 1243 12/05/24 1245 12/05/24 1317 12/05/24 1318   BP:       Pulse: (!) 110  95 97   Resp: 20  25 26   Temp:  99.4 °F (37.4 °C)     TempSrc:  Oral     SpO2: 94%  93% 93%   Weight:       Height:            Patient was given the following medications:  Medications   acetaminophen (TYLENOL) tablet 1,000 mg (1,000 mg Oral Given 12/5/24 1219)   sodium chloride 0.9 % bolus 1,000 mL (1,000 mLs IntraVENous New Bag 12/5/24 1220)   ipratropium 0.5 mg-albuterol 2.5 mg (DUONEB) nebulizer solution 1 Dose (1 Dose Inhalation Given 12/5/24 1209)   - IV ceftriaxone  - IV dilaudid    Medical Decision Making  Patient is a 63yo female who presented to the ED with nonspecific complaints of pain all over, nausea/vomiting, and feeling

## 2024-12-05 NOTE — ANESTHESIA PRE PROCEDURE
Department of Anesthesiology  Preprocedure Note       Name:  Kendy Chinchilla   Age:  64 y.o.  :  1960                                          MRN:  [id number]         Date:  2024      Surgeon: Surgeon(s):  Crispin Ramsey MD    Procedure: Procedure(s):  CYSTOSCOPY, LEFT  URETERAL STENT INSERTION    Medications prior to admission:   Prior to Admission medications    Medication Sig Start Date End Date Taking? Authorizing Provider   ipratropium 0.5 mg-albuterol 2.5 mg (DUONEB) 0.5-2.5 (3) MG/3ML SOLN nebulizer solution Take 3 mLs by nebulization every 6 hours as needed for Shortness of Breath or Wheezing 24  Yes ProviderHernando MD   albuterol sulfate HFA (PROVENTIL;VENTOLIN;PROAIR) 108 (90 Base) MCG/ACT inhaler Inhale 2 puffs into the lungs every 4 hours as needed for Wheezing or Shortness of Breath    Provider, MD Hernando   aspirin 81 MG chewable tablet Take 1 tablet by mouth daily    ProviderHernando MD       Current medications:    Current Facility-Administered Medications   Medication Dose Route Frequency Provider Last Rate Last Admin   • HYDROmorphone HCl PF (DILAUDID) injection 0.5 mg  0.5 mg IntraVENous Q4H PRN Desiree Kern MD       • HYDROmorphone HCl PF (DILAUDID) injection 1 mg  1 mg IntraVENous Q4H PRN Desiree Kern MD   1 mg at 24 1518   • [START ON 2024] aspirin chewable tablet 81 mg  81 mg Oral Daily Desiree Kern MD       • ipratropium 0.5 mg-albuterol 2.5 mg (DUONEB) nebulizer solution 1 Dose  1 Dose Nebulization Q4H PRN Desiree Kern MD       • sodium chloride flush 0.9 % injection 5-40 mL  5-40 mL IntraVENous 2 times per day Desiree Kern MD       • sodium chloride flush 0.9 % injection 5-40 mL  5-40 mL IntraVENous PRN Desiree Kern MD       • 0.9 % sodium chloride infusion   IntraVENous PRN Desiree Kern MD       • ondansetron (ZOFRAN-ODT) disintegrating tablet 4 mg  4 mg Oral Q8H PRN Desiree Kern MD        Or   •

## 2024-12-05 NOTE — ED NOTES
Patient does not want pain medication at this time, patient advised to ring the call bell if she changes her mind or pain increases

## 2024-12-05 NOTE — H&P
Hospitalist Admission Note    NAME:   Kendy Chinchilla   : 1960   MRN: [id number]     Date/Time: 2024 3:47 PM    Patient PCP: No primary care provider on file.    ______________________________________________________________________  Given the patient's current clinical presentation, I have a high level of concern for decompensation if discharged from the emergency department.  Complex decision making was performed, which includes reviewing the patient's available past medical records, laboratory results, and x-ray films.       My assessment of this patient's clinical condition and my plan of care is as follows.    Assessment / Plan:    Sepsis, tachycardia and leukocytosis with source below  2 mm obstructing calculus and left hydronephrosis  CT with 2 mm obstructing calculus left ureterovesical junction causing mild left-sided hydronephrosis and hydroureter.    Admit to medicine  Continue IV ceftriaxone  Gentle IV fluid  Follow blood cultures  Follow-up urine cultures  Urology consulted, expertise appreciated  N.p.o. for now, diet once cleared by urology  IV Dilaudid while n.p.o., transition to p.o. pain management as able    COPD  Tobacco use disorder  DuoNebs as needed  Nicotine    XENA versus CKD  Previous baseline unknown.  Creatinine on admission 2.66.  Trend labs  Avoid nephrotoxic drugs    Abnormal imaging findings  1.1 cm nodule in right lower lobe is increased since 2018 when it was 8mm.  Given growth (defined >2mm) will add pulmonology consult, expertise appreciated    ACPs  Patient desires her daughter Kassandra to be MPOA, will need to make ACPs.  Desires to be DNR, encouraged to share this with her family/MPOA.   to drop off pamphlet and explanation, palliative consult if patient needs more discussion.    Medical Decision Making:   I personally reviewed labs: CBC, CMP  I personally reviewed imaging: CT chest abdomen pelvis  I personally reviewed EKG: Sinus tachycardia to

## 2024-12-06 LAB
ACB COMPLEX DNA BLD POS QL NAA+NON-PROBE: NOT DETECTED
ACCESSION NUMBER, LLC1M: ABNORMAL
ALBUMIN SERPL-MCNC: 2.1 G/DL (ref 3.5–5)
ALBUMIN/GLOB SERPL: 0.6 (ref 1.1–2.2)
ALP SERPL-CCNC: 83 U/L (ref 45–117)
ALT SERPL-CCNC: 14 U/L (ref 12–78)
ANION GAP SERPL CALC-SCNC: 4 MMOL/L (ref 2–12)
AST SERPL-CCNC: 30 U/L (ref 15–37)
B FRAGILIS DNA BLD POS QL NAA+NON-PROBE: NOT DETECTED
BACTERIA SPEC CULT: NORMAL
BASOPHILS # BLD: 0 K/UL (ref 0–0.1)
BASOPHILS NFR BLD: 0 % (ref 0–1)
BILIRUB SERPL-MCNC: 0.3 MG/DL (ref 0.2–1)
BIOFIRE TEST COMMENT: ABNORMAL
BLACTX-M ISLT/SPM QL: NOT DETECTED
BLAIMP ISLT/SPM QL: NOT DETECTED
BLAKPC ISLT/SPM QL: NOT DETECTED
BLAOXA-48-LIKE ISLT/SPM QL: NOT DETECTED
BLAVIM ISLT/SPM QL: NOT DETECTED
BUN SERPL-MCNC: 18 MG/DL (ref 6–20)
BUN/CREAT SERPL: 9 (ref 12–20)
C ALBICANS DNA BLD POS QL NAA+NON-PROBE: NOT DETECTED
C AURIS DNA BLD POS QL NAA+NON-PROBE: NOT DETECTED
C GATTII+NEOFOR DNA BLD POS QL NAA+N-PRB: NOT DETECTED
C GLABRATA DNA BLD POS QL NAA+NON-PROBE: NOT DETECTED
C KRUSEI DNA BLD POS QL NAA+NON-PROBE: NOT DETECTED
C PARAP DNA BLD POS QL NAA+NON-PROBE: NOT DETECTED
C TROPICLS DNA BLD POS QL NAA+NON-PROBE: NOT DETECTED
CALCIUM SERPL-MCNC: 8.3 MG/DL (ref 8.5–10.1)
CHLORIDE SERPL-SCNC: 112 MMOL/L (ref 97–108)
CO2 SERPL-SCNC: 24 MMOL/L (ref 21–32)
CREAT SERPL-MCNC: 1.98 MG/DL (ref 0.55–1.02)
DIFFERENTIAL METHOD BLD: ABNORMAL
E CLOAC COMP DNA BLD POS NAA+NON-PROBE: NOT DETECTED
E COLI DNA BLD POS QL NAA+NON-PROBE: NOT DETECTED
E FAECALIS DNA BLD POS QL NAA+NON-PROBE: NOT DETECTED
E FAECIUM DNA BLD POS QL NAA+NON-PROBE: NOT DETECTED
ENTEROBACTERALES DNA BLD POS NAA+N-PRB: DETECTED
EOSINOPHIL # BLD: 0.2 K/UL (ref 0–0.4)
EOSINOPHIL NFR BLD: 1 % (ref 0–7)
ERYTHROCYTE [DISTWIDTH] IN BLOOD BY AUTOMATED COUNT: 17.3 % (ref 11.5–14.5)
GLOBULIN SER CALC-MCNC: 3.7 G/DL (ref 2–4)
GLUCOSE SERPL-MCNC: 171 MG/DL (ref 65–100)
GP B STREP DNA BLD POS QL NAA+NON-PROBE: NOT DETECTED
HAEM INFLU DNA BLD POS QL NAA+NON-PROBE: NOT DETECTED
HCT VFR BLD AUTO: 33.6 % (ref 35–47)
HGB BLD-MCNC: 10.6 G/DL (ref 11.5–16)
IMM GRANULOCYTES # BLD AUTO: 0.2 K/UL (ref 0–0.04)
IMM GRANULOCYTES NFR BLD AUTO: 1 % (ref 0–0.5)
K OXYTOCA DNA BLD POS QL NAA+NON-PROBE: NOT DETECTED
KLEBSIELLA SP DNA BLD POS QL NAA+NON-PRB: NOT DETECTED
KLEBSIELLA SP DNA BLD POS QL NAA+NON-PRB: NOT DETECTED
L MONOCYTOG DNA BLD POS QL NAA+NON-PROBE: NOT DETECTED
LYMPHOCYTES # BLD: 0.4 K/UL (ref 0.8–3.5)
LYMPHOCYTES NFR BLD: 2 % (ref 12–49)
MCH RBC QN AUTO: 26 PG (ref 26–34)
MCHC RBC AUTO-ENTMCNC: 31.5 G/DL (ref 30–36.5)
MCV RBC AUTO: 82.6 FL (ref 80–99)
MONOCYTES # BLD: 0.4 K/UL (ref 0–1)
MONOCYTES NFR BLD: 2 % (ref 5–13)
N MEN DNA BLD POS QL NAA+NON-PROBE: NOT DETECTED
NEUTS SEG # BLD: 18.1 K/UL (ref 1.8–8)
NEUTS SEG NFR BLD: 94 % (ref 32–75)
NRBC # BLD: 0 K/UL (ref 0–0.01)
NRBC BLD-RTO: 0 PER 100 WBC
P AERUGINOSA DNA BLD POS NAA+NON-PROBE: NOT DETECTED
PLATELET # BLD AUTO: 208 K/UL (ref 150–400)
PMV BLD AUTO: 9.7 FL (ref 8.9–12.9)
POTASSIUM SERPL-SCNC: 4.2 MMOL/L (ref 3.5–5.1)
PROT SERPL-MCNC: 5.8 G/DL (ref 6.4–8.2)
PROTEUS SP DNA BLD POS QL NAA+NON-PROBE: DETECTED
RBC # BLD AUTO: 4.07 M/UL (ref 3.8–5.2)
RBC MORPH BLD: ABNORMAL
RESISTANT GENE NDM BY PCR: NOT DETECTED
RESISTANT GENE TARGETS: ABNORMAL
S AUREUS DNA BLD POS QL NAA+NON-PROBE: NOT DETECTED
S AUREUS+CONS DNA BLD POS NAA+NON-PROBE: NOT DETECTED
S EPIDERMIDIS DNA BLD POS QL NAA+NON-PRB: NOT DETECTED
S LUGDUNENSIS DNA BLD POS QL NAA+NON-PRB: NOT DETECTED
S MALTOPHILIA DNA BLD POS QL NAA+NON-PRB: NOT DETECTED
S MARCESCENS DNA BLD POS NAA+NON-PROBE: NOT DETECTED
S PNEUM DNA BLD POS QL NAA+NON-PROBE: NOT DETECTED
S PYO DNA BLD POS QL NAA+NON-PROBE: NOT DETECTED
SALMONELLA DNA BLD POS QL NAA+NON-PROBE: NOT DETECTED
SERVICE CMNT-IMP: NORMAL
SODIUM SERPL-SCNC: 140 MMOL/L (ref 136–145)
STREPTOCOCCUS DNA BLD POS NAA+NON-PROBE: NOT DETECTED
WBC # BLD AUTO: 19.3 K/UL (ref 3.6–11)

## 2024-12-06 PROCEDURE — 36415 COLL VENOUS BLD VENIPUNCTURE: CPT

## 2024-12-06 PROCEDURE — 85025 COMPLETE CBC W/AUTO DIFF WBC: CPT

## 2024-12-06 PROCEDURE — 2580000003 HC RX 258: Performed by: STUDENT IN AN ORGANIZED HEALTH CARE EDUCATION/TRAINING PROGRAM

## 2024-12-06 PROCEDURE — 6360000002 HC RX W HCPCS: Performed by: STUDENT IN AN ORGANIZED HEALTH CARE EDUCATION/TRAINING PROGRAM

## 2024-12-06 PROCEDURE — 80053 COMPREHEN METABOLIC PANEL: CPT

## 2024-12-06 PROCEDURE — 51798 US URINE CAPACITY MEASURE: CPT

## 2024-12-06 PROCEDURE — 6370000000 HC RX 637 (ALT 250 FOR IP): Performed by: STUDENT IN AN ORGANIZED HEALTH CARE EDUCATION/TRAINING PROGRAM

## 2024-12-06 PROCEDURE — 2500000003 HC RX 250 WO HCPCS: Performed by: STUDENT IN AN ORGANIZED HEALTH CARE EDUCATION/TRAINING PROGRAM

## 2024-12-06 PROCEDURE — 2700000000 HC OXYGEN THERAPY PER DAY

## 2024-12-06 PROCEDURE — 1100000003 HC PRIVATE W/ TELEMETRY

## 2024-12-06 RX ORDER — ENOXAPARIN SODIUM 100 MG/ML
30 INJECTION SUBCUTANEOUS 2 TIMES DAILY
Status: DISCONTINUED | OUTPATIENT
Start: 2024-12-06 | End: 2024-12-08

## 2024-12-06 RX ORDER — OXYCODONE HYDROCHLORIDE 5 MG/1
5 TABLET ORAL EVERY 4 HOURS PRN
Status: DISCONTINUED | OUTPATIENT
Start: 2024-12-06 | End: 2024-12-09 | Stop reason: HOSPADM

## 2024-12-06 RX ADMIN — SODIUM CHLORIDE, PRESERVATIVE FREE 10 ML: 5 INJECTION INTRAVENOUS at 09:33

## 2024-12-06 RX ADMIN — HYDROMORPHONE HYDROCHLORIDE 1 MG: 1 INJECTION, SOLUTION INTRAMUSCULAR; INTRAVENOUS; SUBCUTANEOUS at 09:03

## 2024-12-06 RX ADMIN — SODIUM CHLORIDE, PRESERVATIVE FREE 10 ML: 5 INJECTION INTRAVENOUS at 21:11

## 2024-12-06 RX ADMIN — OXYCODONE 5 MG: 5 TABLET ORAL at 19:38

## 2024-12-06 RX ADMIN — WATER 2000 MG: 1 INJECTION INTRAMUSCULAR; INTRAVENOUS; SUBCUTANEOUS at 13:33

## 2024-12-06 RX ADMIN — ASPIRIN 81 MG: 81 TABLET, CHEWABLE ORAL at 09:03

## 2024-12-06 RX ADMIN — HYDROMORPHONE HYDROCHLORIDE 1 MG: 1 INJECTION, SOLUTION INTRAMUSCULAR; INTRAVENOUS; SUBCUTANEOUS at 14:16

## 2024-12-06 ASSESSMENT — PAIN - FUNCTIONAL ASSESSMENT
PAIN_FUNCTIONAL_ASSESSMENT: ACTIVITIES ARE NOT PREVENTED
PAIN_FUNCTIONAL_ASSESSMENT: ACTIVITIES ARE NOT PREVENTED

## 2024-12-06 ASSESSMENT — PAIN DESCRIPTION - LOCATION
LOCATION: ABDOMEN
LOCATION: FLANK
LOCATION: ABDOMEN

## 2024-12-06 ASSESSMENT — PAIN SCALES - GENERAL
PAINLEVEL_OUTOF10: 7
PAINLEVEL_OUTOF10: 2
PAINLEVEL_OUTOF10: 0
PAINLEVEL_OUTOF10: 7
PAINLEVEL_OUTOF10: 6
PAINLEVEL_OUTOF10: 6
PAINLEVEL_OUTOF10: 4
PAINLEVEL_OUTOF10: 0

## 2024-12-06 ASSESSMENT — PAIN DESCRIPTION - ORIENTATION
ORIENTATION: MID
ORIENTATION: RIGHT
ORIENTATION: MID

## 2024-12-06 ASSESSMENT — PAIN DESCRIPTION - DESCRIPTORS
DESCRIPTORS: ACHING
DESCRIPTORS: ACHING
DESCRIPTORS: NAGGING
DESCRIPTORS: ACHING
DESCRIPTORS: ACHING

## 2024-12-06 NOTE — OP NOTE
PATIENT NAME: Kendy Chinchilla     DATE: 12/05/24     PROCEDURE PERFORMED:      ANESTHESIA: General     SPECIMENS: None    INDICATIONS:   64 y.o woman with infected / obstructing 3mm L-UVJ stone, being taken urgently to OR for L-ureteral stent placement.     FINDINGS:  Unremarkable cystoscopy, successful placement of [id number] double-J ureteral stent on the left. Swain catheter to gravity drainage. Ucx obtained.      PROCEDURE IN DETAIL:   Informed consent was obtained, and the pt was taken to the OR and placed on the table in a supine position. Pt had already received IV cefipime for perioperative ppx. Intubated, converted to lithotomy position, prepped / draped in sterile fashion .Formal timeout conducted, and I confirmed the left side was the correct side. Cystoscopy performed with 21F rigid cystoscope, findings as above. 0.035 inch sensor wire advanced through scope and cannulated through L-UO, and advanced up to L-kidney under flouroscopic guidance. [id number] double-J ureteral stent then advanced over wire and pushed proximally to level of L-kidney. Eflux of purulent material, culture obtained cystoscopically. Working wire removed, and good proximal curl noted in kidney on flouro. Good distal curl noted in bladder on flouro and via direct visualization. 16F urethral swain then advanced through urethra and into bladder, balloon inflated with 10mL sterile water. This concluded the procedure, which the patient tolerated well.      COMPLICATIONS: None     DISPOSITION:   Recommend continued admission for culture-driven IV ABX management, urethral decompression until signs of sepsis have resolved (can void trial after this), will schedule for outpatient follow up with me, for definitive stone management.

## 2024-12-06 NOTE — PLAN OF CARE
Problem: Pain  Goal: Verbalizes/displays adequate comfort level or baseline comfort level  12/5/2024 2358 by Neli Montesinos, RN  Outcome: Progressing  Flowsheets (Taken 12/5/2024 2358)  Verbalizes/displays adequate comfort level or baseline comfort level:   Encourage patient to monitor pain and request assistance   Assess pain using appropriate pain scale  12/5/2024 1706 by Lucy Cronin, RN  Outcome: Progressing     Problem: Genitourinary - Adult  Goal: Urinary catheter remains patent  Outcome: Progressing  Flowsheets (Taken 12/5/2024 2358)  Urinary catheter remains patent: Assess patency of urinary catheter     Problem: Metabolic/Fluid and Electrolytes - Adult  Goal: Hemodynamic stability and optimal renal function maintained  Outcome: Progressing  Flowsheets (Taken 12/5/2024 2358)  Hemodynamic stability and optimal renal function maintained:   Monitor labs and assess for signs and symptoms of volume excess or deficit   Monitor intake, output and patient weight   Encourage oral intake as appropriate

## 2024-12-06 NOTE — ANESTHESIA POSTPROCEDURE EVALUATION
Department of Anesthesiology  Postprocedure Note    Patient: Kendy Chinchilla  MRN: [id number]  YOB: 1960  Date of evaluation: 12/5/2024    Procedure Summary       Date: 12/05/24 Room / Location: [facility] MAIN OR  / [facility] MAIN OR    Anesthesia Start: 2054 Anesthesia Stop: 2142    Procedure: CYSTOSCOPY, LEFT  URETERAL STENT INSERTION (Left: Ureter) Diagnosis:       Kidney stone      (Kidney stone [N20.0])    Providers: Crispin Ramsey MD Responsible Provider: Hernandez Camacho MD    Anesthesia Type: General ASA Status: 3 - Emergent            Anesthesia Type: General    Venkata Phase I: Venkata Score: 9    Venkata Phase II:      Anesthesia Post Evaluation    Patient location during evaluation: PACU  Patient participation: complete - patient participated  Level of consciousness: sleepy but conscious and responsive to verbal stimuli  Airway patency: patent  Nausea & Vomiting: no vomiting and no nausea  Cardiovascular status: blood pressure returned to baseline and hemodynamically stable  Respiratory status: acceptable  Hydration status: stable    No notable events documented.

## 2024-12-06 NOTE — ACP (ADVANCE CARE PLANNING)
Advance Care Planning     Advance Care Planning Inpatient Note  [facility]    Today's Date: 12/6/2024  Unit: [facility] 3 SURG TELE    Received request from IDT Member.  Upon review of chart and communication with care team, patient's decision making abilities are not in question.. Patient and Friends was/were present in the room during visit.    Goals of ACP Conversation:  Discuss advance care planning documents. We were able to complete the Advance Medical Directive.     Health Care Decision Makers:       Primary Decision Maker: Troy Aldana - Jazmine - [phone]    Secondary Decision Maker: Arlen Aldana - Maria Dolores - [phone]  Summary:  Completed New Documents  Completed Advance Care Planning Directive for patient    Healthcare Power of /Advance Directive Appointment of Health Care Agent     Assessment:  Patient able to clearly state her wishes for the Advance Medical Directive. She had spoken with her Doctor earlier today. She listed her Agents.       Interventions:  Requested patient/family to submit existing document for our records: Healthcare Power of /Advance Directive Appointment of Health Care Agent  Provided education on documents for clarity and greater understanding  Assisted in the completion of documents according to patient's wishes at this time    Care Preferences Communicated:   See Advance Medical Directive    Outcomes/Plan:  ACP Discussion: Completed  New advance directive completed.    Electronically signed by Chaplain SHAI on 12/6/2024 at 12:14 PM

## 2024-12-07 LAB
ANION GAP SERPL CALC-SCNC: 7 MMOL/L (ref 2–12)
BACTERIA SPEC CULT: ABNORMAL
BUN SERPL-MCNC: 26 MG/DL (ref 6–20)
BUN/CREAT SERPL: 17 (ref 12–20)
CALCIUM SERPL-MCNC: 8.7 MG/DL (ref 8.5–10.1)
CC UR VC: ABNORMAL
CHLORIDE SERPL-SCNC: 109 MMOL/L (ref 97–108)
CO2 SERPL-SCNC: 22 MMOL/L (ref 21–32)
CREAT SERPL-MCNC: 1.55 MG/DL (ref 0.55–1.02)
ERYTHROCYTE [DISTWIDTH] IN BLOOD BY AUTOMATED COUNT: 17.2 % (ref 11.5–14.5)
GLUCOSE SERPL-MCNC: 138 MG/DL (ref 65–100)
HCT VFR BLD AUTO: 31.4 % (ref 35–47)
HGB BLD-MCNC: 10 G/DL (ref 11.5–16)
MCH RBC QN AUTO: 26 PG (ref 26–34)
MCHC RBC AUTO-ENTMCNC: 31.8 G/DL (ref 30–36.5)
MCV RBC AUTO: 81.6 FL (ref 80–99)
NRBC # BLD: 0 K/UL (ref 0–0.01)
NRBC BLD-RTO: 0 PER 100 WBC
PLATELET # BLD AUTO: 234 K/UL (ref 150–400)
PMV BLD AUTO: 10.2 FL (ref 8.9–12.9)
POTASSIUM SERPL-SCNC: 4.1 MMOL/L (ref 3.5–5.1)
RBC # BLD AUTO: 3.85 M/UL (ref 3.8–5.2)
SERVICE CMNT-IMP: ABNORMAL
SODIUM SERPL-SCNC: 138 MMOL/L (ref 136–145)
WBC # BLD AUTO: 24.7 K/UL (ref 3.6–11)

## 2024-12-07 PROCEDURE — 2580000003 HC RX 258: Performed by: STUDENT IN AN ORGANIZED HEALTH CARE EDUCATION/TRAINING PROGRAM

## 2024-12-07 PROCEDURE — 6370000000 HC RX 637 (ALT 250 FOR IP): Performed by: STUDENT IN AN ORGANIZED HEALTH CARE EDUCATION/TRAINING PROGRAM

## 2024-12-07 PROCEDURE — 1100000003 HC PRIVATE W/ TELEMETRY

## 2024-12-07 PROCEDURE — 80048 BASIC METABOLIC PNL TOTAL CA: CPT

## 2024-12-07 PROCEDURE — 36415 COLL VENOUS BLD VENIPUNCTURE: CPT

## 2024-12-07 PROCEDURE — 85027 COMPLETE CBC AUTOMATED: CPT

## 2024-12-07 PROCEDURE — 6360000002 HC RX W HCPCS: Performed by: STUDENT IN AN ORGANIZED HEALTH CARE EDUCATION/TRAINING PROGRAM

## 2024-12-07 PROCEDURE — 87040 BLOOD CULTURE FOR BACTERIA: CPT

## 2024-12-07 RX ORDER — SIMETHICONE 80 MG
80 TABLET,CHEWABLE ORAL EVERY 6 HOURS PRN
Status: DISCONTINUED | OUTPATIENT
Start: 2024-12-07 | End: 2024-12-09 | Stop reason: HOSPADM

## 2024-12-07 RX ORDER — L.ACID/L.CASEI/B.BIF/B.LON/FOS 2B CELL-50
1 CAPSULE ORAL DAILY
Status: DISCONTINUED | OUTPATIENT
Start: 2024-12-08 | End: 2024-12-08

## 2024-12-07 RX ORDER — POLYETHYLENE GLYCOL 3350 17 G/17G
17 POWDER, FOR SOLUTION ORAL ONCE
Status: DISCONTINUED | OUTPATIENT
Start: 2024-12-08 | End: 2024-12-09 | Stop reason: HOSPADM

## 2024-12-07 RX ADMIN — SIMETHICONE 80 MG: 80 TABLET, CHEWABLE ORAL at 12:59

## 2024-12-07 RX ADMIN — OXYCODONE 5 MG: 5 TABLET ORAL at 05:06

## 2024-12-07 RX ADMIN — SIMETHICONE 80 MG: 80 TABLET, CHEWABLE ORAL at 21:59

## 2024-12-07 RX ADMIN — OXYCODONE 5 MG: 5 TABLET ORAL at 15:09

## 2024-12-07 RX ADMIN — SODIUM CHLORIDE, PRESERVATIVE FREE 10 ML: 5 INJECTION INTRAVENOUS at 21:59

## 2024-12-07 RX ADMIN — ASPIRIN 81 MG: 81 TABLET, CHEWABLE ORAL at 09:09

## 2024-12-07 RX ADMIN — SODIUM CHLORIDE, PRESERVATIVE FREE 10 ML: 5 INJECTION INTRAVENOUS at 09:12

## 2024-12-07 RX ADMIN — WATER 2000 MG: 1 INJECTION INTRAMUSCULAR; INTRAVENOUS; SUBCUTANEOUS at 12:59

## 2024-12-07 RX ADMIN — ACETAMINOPHEN 650 MG: 325 TABLET ORAL at 23:59

## 2024-12-07 RX ADMIN — POLYETHYLENE GLYCOL 3350 17 G: 17 POWDER, FOR SOLUTION ORAL at 09:09

## 2024-12-07 ASSESSMENT — PAIN SCALES - GENERAL
PAINLEVEL_OUTOF10: 8
PAINLEVEL_OUTOF10: 7
PAINLEVEL_OUTOF10: 8
PAINLEVEL_OUTOF10: 2
PAINLEVEL_OUTOF10: 0

## 2024-12-07 ASSESSMENT — PAIN DESCRIPTION - ORIENTATION
ORIENTATION: MID
ORIENTATION: LOWER

## 2024-12-07 ASSESSMENT — PAIN DESCRIPTION - DESCRIPTORS
DESCRIPTORS: ACHING
DESCRIPTORS: ACHING

## 2024-12-07 ASSESSMENT — PAIN DESCRIPTION - LOCATION
LOCATION: ABDOMEN

## 2024-12-08 ENCOUNTER — APPOINTMENT (OUTPATIENT)
Facility: HOSPITAL | Age: 64
DRG: 854 | End: 2024-12-08
Payer: MEDICARE

## 2024-12-08 LAB
ANION GAP SERPL CALC-SCNC: 6 MMOL/L (ref 2–12)
BACTERIA SPEC CULT: ABNORMAL
BUN SERPL-MCNC: 21 MG/DL (ref 6–20)
BUN/CREAT SERPL: 17 (ref 12–20)
CALCIUM SERPL-MCNC: 7.6 MG/DL (ref 8.5–10.1)
CHLORIDE SERPL-SCNC: 114 MMOL/L (ref 97–108)
CO2 SERPL-SCNC: 22 MMOL/L (ref 21–32)
CREAT SERPL-MCNC: 1.27 MG/DL (ref 0.55–1.02)
ERYTHROCYTE [DISTWIDTH] IN BLOOD BY AUTOMATED COUNT: 17.1 % (ref 11.5–14.5)
GLUCOSE SERPL-MCNC: 86 MG/DL (ref 65–100)
HCT VFR BLD AUTO: 33.6 % (ref 35–47)
HGB BLD-MCNC: 10.4 G/DL (ref 11.5–16)
MAGNESIUM SERPL-MCNC: 1.9 MG/DL (ref 1.6–2.4)
MCH RBC QN AUTO: 25.5 PG (ref 26–34)
MCHC RBC AUTO-ENTMCNC: 31 G/DL (ref 30–36.5)
MCV RBC AUTO: 82.4 FL (ref 80–99)
NRBC # BLD: 0.02 K/UL (ref 0–0.01)
NRBC BLD-RTO: 0.1 PER 100 WBC
PLATELET # BLD AUTO: 272 K/UL (ref 150–400)
PMV BLD AUTO: 10.2 FL (ref 8.9–12.9)
POTASSIUM SERPL-SCNC: 3.7 MMOL/L (ref 3.5–5.1)
RBC # BLD AUTO: 4.08 M/UL (ref 3.8–5.2)
SERVICE CMNT-IMP: ABNORMAL
SERVICE CMNT-IMP: ABNORMAL
SODIUM SERPL-SCNC: 142 MMOL/L (ref 136–145)
WBC # BLD AUTO: 15.5 K/UL (ref 3.6–11)

## 2024-12-08 PROCEDURE — 2580000003 HC RX 258: Performed by: STUDENT IN AN ORGANIZED HEALTH CARE EDUCATION/TRAINING PROGRAM

## 2024-12-08 PROCEDURE — 6360000002 HC RX W HCPCS: Performed by: STUDENT IN AN ORGANIZED HEALTH CARE EDUCATION/TRAINING PROGRAM

## 2024-12-08 PROCEDURE — 36415 COLL VENOUS BLD VENIPUNCTURE: CPT

## 2024-12-08 PROCEDURE — 6370000000 HC RX 637 (ALT 250 FOR IP): Performed by: STUDENT IN AN ORGANIZED HEALTH CARE EDUCATION/TRAINING PROGRAM

## 2024-12-08 PROCEDURE — 83735 ASSAY OF MAGNESIUM: CPT

## 2024-12-08 PROCEDURE — 2060000000 HC ICU INTERMEDIATE R&B

## 2024-12-08 PROCEDURE — 2500000003 HC RX 250 WO HCPCS: Performed by: STUDENT IN AN ORGANIZED HEALTH CARE EDUCATION/TRAINING PROGRAM

## 2024-12-08 PROCEDURE — 6370000000 HC RX 637 (ALT 250 FOR IP): Performed by: INTERNAL MEDICINE

## 2024-12-08 PROCEDURE — 6370000000 HC RX 637 (ALT 250 FOR IP): Performed by: NURSE PRACTITIONER

## 2024-12-08 PROCEDURE — 74018 RADEX ABDOMEN 1 VIEW: CPT

## 2024-12-08 PROCEDURE — 85027 COMPLETE CBC AUTOMATED: CPT

## 2024-12-08 PROCEDURE — 80048 BASIC METABOLIC PNL TOTAL CA: CPT

## 2024-12-08 RX ORDER — CALCIUM GLUCONATE 20 MG/ML
1000 INJECTION, SOLUTION INTRAVENOUS ONCE
Status: COMPLETED | OUTPATIENT
Start: 2024-12-08 | End: 2024-12-08

## 2024-12-08 RX ORDER — 0.9 % SODIUM CHLORIDE 0.9 %
500 INTRAVENOUS SOLUTION INTRAVENOUS ONCE
Status: COMPLETED | OUTPATIENT
Start: 2024-12-08 | End: 2024-12-08

## 2024-12-08 RX ORDER — POTASSIUM CHLORIDE 1500 MG/1
20 TABLET, EXTENDED RELEASE ORAL ONCE
Status: COMPLETED | OUTPATIENT
Start: 2024-12-08 | End: 2024-12-08

## 2024-12-08 RX ORDER — DILTIAZEM HYDROCHLORIDE 5 MG/ML
10 INJECTION INTRAVENOUS ONCE
Status: COMPLETED | OUTPATIENT
Start: 2024-12-08 | End: 2024-12-08

## 2024-12-08 RX ORDER — DILTIAZEM HCL 60 MG
60 TABLET ORAL EVERY 6 HOURS SCHEDULED
Status: DISCONTINUED | OUTPATIENT
Start: 2024-12-08 | End: 2024-12-09

## 2024-12-08 RX ORDER — LANOLIN ALCOHOL/MO/W.PET/CERES
400 CREAM (GRAM) TOPICAL ONCE
Status: COMPLETED | OUTPATIENT
Start: 2024-12-08 | End: 2024-12-08

## 2024-12-08 RX ORDER — LACTOBACILLUS RHAMNOSUS GG 10B CELL
1 CAPSULE ORAL DAILY
Status: DISCONTINUED | OUTPATIENT
Start: 2024-12-08 | End: 2024-12-09 | Stop reason: HOSPADM

## 2024-12-08 RX ORDER — MAGNESIUM SULFATE 1 G/100ML
1000 INJECTION INTRAVENOUS ONCE
Status: COMPLETED | OUTPATIENT
Start: 2024-12-08 | End: 2024-12-08

## 2024-12-08 RX ADMIN — SODIUM CHLORIDE, PRESERVATIVE FREE 10 ML: 5 INJECTION INTRAVENOUS at 20:47

## 2024-12-08 RX ADMIN — ACETAMINOPHEN 650 MG: 325 TABLET ORAL at 08:13

## 2024-12-08 RX ADMIN — WATER 2000 MG: 1 INJECTION INTRAMUSCULAR; INTRAVENOUS; SUBCUTANEOUS at 12:45

## 2024-12-08 RX ADMIN — SODIUM CHLORIDE 5 MG/HR: 900 INJECTION, SOLUTION INTRAVENOUS at 01:07

## 2024-12-08 RX ADMIN — SODIUM CHLORIDE 15 MG/HR: 900 INJECTION, SOLUTION INTRAVENOUS at 09:33

## 2024-12-08 RX ADMIN — SODIUM CHLORIDE, PRESERVATIVE FREE 10 ML: 5 INJECTION INTRAVENOUS at 09:57

## 2024-12-08 RX ADMIN — CALCIUM GLUCONATE 1000 MG: 20 INJECTION, SOLUTION INTRAVENOUS at 06:16

## 2024-12-08 RX ADMIN — Medication 400 MG: at 14:46

## 2024-12-08 RX ADMIN — DILTIAZEM HYDROCHLORIDE 10 MG: 5 INJECTION, SOLUTION INTRAVENOUS at 00:58

## 2024-12-08 RX ADMIN — DILTIAZEM HYDROCHLORIDE 60 MG: 60 TABLET ORAL at 14:46

## 2024-12-08 RX ADMIN — APIXABAN 5 MG: 5 TABLET, FILM COATED ORAL at 20:47

## 2024-12-08 RX ADMIN — Medication 6 MG: at 20:48

## 2024-12-08 RX ADMIN — MAGNESIUM SULFATE 1000 MG: 1 INJECTION INTRAVENOUS at 07:03

## 2024-12-08 RX ADMIN — DILTIAZEM HYDROCHLORIDE 60 MG: 60 TABLET ORAL at 20:46

## 2024-12-08 RX ADMIN — ACETAMINOPHEN 650 MG: 325 TABLET ORAL at 20:48

## 2024-12-08 RX ADMIN — POTASSIUM CHLORIDE 20 MEQ: 1500 TABLET, EXTENDED RELEASE ORAL at 14:46

## 2024-12-08 RX ADMIN — POLYETHYLENE GLYCOL 3350 17 G: 17 POWDER, FOR SOLUTION ORAL at 06:58

## 2024-12-08 RX ADMIN — SODIUM CHLORIDE 500 ML: 9 INJECTION, SOLUTION INTRAVENOUS at 01:01

## 2024-12-08 RX ADMIN — ASPIRIN 81 MG: 81 TABLET, CHEWABLE ORAL at 08:13

## 2024-12-08 RX ADMIN — Medication 1 CAPSULE: at 09:34

## 2024-12-08 ASSESSMENT — PAIN DESCRIPTION - ORIENTATION
ORIENTATION: LEFT
ORIENTATION: LEFT;LOWER

## 2024-12-08 ASSESSMENT — PAIN SCALES - GENERAL
PAINLEVEL_OUTOF10: 3
PAINLEVEL_OUTOF10: 0
PAINLEVEL_OUTOF10: 0
PAINLEVEL_OUTOF10: 6

## 2024-12-08 ASSESSMENT — PAIN DESCRIPTION - DESCRIPTORS
DESCRIPTORS: ACHING
DESCRIPTORS: ACHING

## 2024-12-08 ASSESSMENT — PAIN DESCRIPTION - ONSET: ONSET: GRADUAL

## 2024-12-08 ASSESSMENT — PAIN DESCRIPTION - LOCATION
LOCATION: OTHER (COMMENT)
LOCATION: ABDOMEN

## 2024-12-08 ASSESSMENT — PAIN DESCRIPTION - FREQUENCY: FREQUENCY: INTERMITTENT

## 2024-12-08 ASSESSMENT — PAIN DESCRIPTION - PAIN TYPE: TYPE: ACUTE PAIN

## 2024-12-08 NOTE — SIGNIFICANT EVENT
RAPID RESPONSE TEAM    Overhead rapid response paged to room # 3124  at 0005    Reason for rapid response: Afib RVR    Initial assessment:   Patient is alert and oriented, does not appear to be in distress. HR 140s- Afib on monitor, /87. Respirations even and unlabored, denies CP and shortness of breath. Patient is complaining of abdominal discomfort, states she needs to have BM but is unable. Abdomen is soft, non-distended, Last BM 10/2. Raines in place, vera urine noted in canister. UOP- 1875ml x24hr.     Dr. Alvarez at bedside, orders received for the following Interventions:     - EKG- Afib, no ischemic changes per MD  - Diltiazem bolus and infusion  - 500 ml NS bolus     Outcome:   pt to transferred to room # 2315 for diltiazem gtt.         Please call with any questions or concerns    Domonique Javed RN  Rapid Response Team  Ext [phone]     Recent Results (from the past 8 hour(s))   CBC    Collection Time: 12/08/24 12:53 AM   Result Value Ref Range    WBC 15.5 (H) 3.6 - 11.0 K/uL    RBC 4.08 3.80 - 5.20 M/uL    Hemoglobin 10.4 (L) 11.5 - 16.0 g/dL    Hematocrit 33.6 (L) 35.0 - 47.0 %    MCV 82.4 80.0 - 99.0 FL    MCH 25.5 (L) 26.0 - 34.0 PG    MCHC 31.0 30.0 - 36.5 g/dL    RDW 17.1 (H) 11.5 - 14.5 %    Platelets 272 150 - 400 K/uL    MPV 10.2 8.9 - 12.9 FL    Nucleated RBCs 0.1 (H) 0  WBC    nRBC 0.02 (H) 0.00 - 0.01 K/uL

## 2024-12-08 NOTE — PLAN OF CARE
Problem: Discharge Planning  Goal: Discharge to home or other facility with appropriate resources  12/7/2024 1217 by Kenisha Tovar LPN  Outcome: Progressing  Flowsheets (Taken 12/7/2024 0840)  Discharge to home or other facility with appropriate resources:   Identify barriers to discharge with patient and caregiver   Arrange for needed discharge resources and transportation as appropriate   Identify discharge learning needs (meds, wound care, etc)     Problem: Pain  Goal: Verbalizes/displays adequate comfort level or baseline comfort level  12/8/2024 0139 by Rin Singh RN  Outcome: Progressing  Flowsheets (Taken 12/7/2024 1509 by Kenisha Tovar LPN)  Verbalizes/displays adequate comfort level or baseline comfort level:   Assess pain using appropriate pain scale   Encourage patient to monitor pain and request assistance   Administer analgesics based on type and severity of pain and evaluate response  12/7/2024 1217 by Kenisha Tovar LPN  Outcome: Progressing  Flowsheets (Taken 12/7/2024 0840)  Verbalizes/displays adequate comfort level or baseline comfort level:   Encourage patient to monitor pain and request assistance   Administer analgesics based on type and severity of pain and evaluate response   Assess pain using appropriate pain scale     Problem: Safety - Adult  Goal: Free from fall injury  12/8/2024 0139 by Rin Singh RN  Outcome: Progressing  12/7/2024 1217 by Kenisha Tovar LPN  Outcome: Progressing     Problem: Genitourinary - Adult  Goal: Urinary catheter remains patent  Outcome: Progressing

## 2024-12-08 NOTE — PLAN OF CARE
Problem: Discharge Planning  Goal: Discharge to home or other facility with appropriate resources  Outcome: Progressing  Flowsheets (Taken 12/8/2024 0730)  Discharge to home or other facility with appropriate resources: Identify barriers to discharge with patient and caregiver     Problem: Pain  Goal: Verbalizes/displays adequate comfort level or baseline comfort level  12/8/2024 1023 by Iliana Johnson RN  Outcome: Progressing  12/8/2024 0139 by Rin Singh RN  Outcome: Progressing  Flowsheets (Taken 12/7/2024 1509 by Kenisha Tovar LPN)  Verbalizes/displays adequate comfort level or baseline comfort level:   Assess pain using appropriate pain scale   Encourage patient to monitor pain and request assistance   Administer analgesics based on type and severity of pain and evaluate response     Problem: Safety - Adult  Goal: Free from fall injury  12/8/2024 1023 by Iliana Johnson RN  Outcome: Progressing  12/8/2024 0139 by Rin Singh RN  Outcome: Progressing     Problem: ABCDS Injury Assessment  Goal: Absence of physical injury  Outcome: Progressing     Problem: Genitourinary - Adult  Goal: Urinary catheter remains patent  12/8/2024 1023 by Iliana Johnson RN  Outcome: Progressing  Flowsheets (Taken 12/8/2024 0730)  Urinary catheter remains patent: Assess patency of urinary catheter  12/8/2024 0139 by Rin Singh RN  Outcome: Progressing     Problem: Metabolic/Fluid and Electrolytes - Adult  Goal: Hemodynamic stability and optimal renal function maintained  Outcome: Progressing  Flowsheets (Taken 12/8/2024 0730)  Hemodynamic stability and optimal renal function maintained: Monitor labs and assess for signs and symptoms of volume excess or deficit

## 2024-12-08 NOTE — CONSULTS
Pulmonary, Critical Care, and Sleep Medicine~Consult Note    Name: Kendy Chinchilla MRN: [id number]   : 1960 Hospital: [facility]   Date: 2024 3:36 PM Admission: 2024     Impression Plan   Lung nodule  COPD--not in acute exacerbation  Sepsis  Bacteremia  Obstructing calculus left ureterovesical junction causing mild left-sided hydronephrosis and hydroureter.   Tobacco abuse Recommend outpatient PET/CT and PFTs.   Thank you for the consult, I will arrange close follow up with my office after discharge. Will see again prn     Daily Progression:    Consult pulmonary nodule growth, coordination of IP workup vs OP workup    HPI: 65 y/o F with PMH COPD who presented with left flank/back pain and malaise.    CT chest/abd/pelvis 24:   1. 2 mm obstructing calculus at the left ureterovesical junction, causing mild  left-sided hydronephrosis and hydroureter.  2. 1.1 cm nodule in the right lower lobe is increased from 8 mm since 2018.    ROS: she is feeling better compared to admission. She uses albuterol prn for her COPD. She has chronic SOB. She has never seen a lung doctor before    Social hx: smokes 1-1.5 PPD since age 10    I have reviewed the labs and previous day’s notes.    Pertinent items are noted in HPI.  Past Medical History:   Diagnosis Date    COPD (chronic obstructive pulmonary disease) (HCC)       Past Surgical History:   Procedure Laterality Date    CATARACT EXTRACTION Bilateral      SECTION      x1    CYSTOSCOPY Left 2024    CYSTOSCOPY, LEFT  URETERAL STENT INSERTION performed by Crispin [facility] MD at [facility] MAIN OR    EYE SURGERY Left     Mass removed from behind left eye      Prior to Admission medications    Medication Sig Start Date End Date Taking? Authorizing Provider   albuterol sulfate HFA (PROVENTIL;VENTOLIN;PROAIR) 108 (90 Base) MCG/ACT inhaler Inhale 2 puffs into the lungs every 4 hours as needed for Wheezing 
junction, causing mild left-sided hydronephrosis and hydroureter. 2. 1.1 cm nodule in the right lower lobe is increased since 2018. Electronically signed by SAROJ GAINES      Past Medical History:   Past Medical History:   Diagnosis Date    COPD (chronic obstructive pulmonary disease) ([facility])       No Known Allergies   Prior to Admission medications    Medication Sig Start Date End Date Taking? Authorizing Provider   ipratropium 0.5 mg-albuterol 2.5 mg (DUONEB) 0.5-2.5 (3) MG/3ML SOLN nebulizer solution Take 3 mLs by nebulization every 6 hours as needed for Shortness of Breath or Wheezing 24  Yes ProviderHernando MD   albuterol sulfate HFA (PROVENTIL;VENTOLIN;PROAIR) 108 (90 Base) MCG/ACT inhaler Inhale 2 puffs into the lungs every 4 hours as needed for Wheezing or Shortness of Breath    Provider, MD Hernando   aspirin 81 MG chewable tablet Take 1 tablet by mouth daily    ProviderHernando MD      PMHx:  has a past medical history of COPD (chronic obstructive pulmonary disease) ([facility]).   PSurgHx:  has a past surgical history that includes  section; Eye surgery (Left); and Cataract extraction (Bilateral).  PSocHx:  reports that she has been smoking cigarettes. She started smoking about 59 years ago. She has a 59.9 pack-year smoking history. She has never used smokeless tobacco. She reports current alcohol use. She reports that she does not currently use drugs.   ROS:  Admission ROS by Desiree Kern MD from 2024 were reviewed with the patient and changes (other than per HPI) include: none.    Physical Exam    General Appearance: NAD, awake  HENT: atraumatic, normal ears  Cardiovascular: not tachycardic, no LE edema  Respiratory: no distress, room air  Abdomen: soft, no suprapubic fullness or tenderness  : left CVA tenderness  Extremities: moves all  Musculoskeletal: normal alignment of neck and head  Neuro: Appropriate, no focal neurological deficits  Mood/Affect: 
10.4*   HCT 33.6* 31.4* 33.6*    234 272     Recent Labs     12/06/24  0447 12/07/24  0614 12/08/24  0312    138 142   K 4.2 4.1 3.7   * 109* 114*   CO2 24 22 22   BUN 18 26* 21*   CREATININE 1.98* 1.55* 1.27*   MG  --   --  1.9   ALT 14  --   --      No results for input(s): \"CPK\", \"CKMB\" in the last 72 hours.    Invalid input(s): \"CKQMB\", \"CPKMB\", \"TROIQ\", \"BMPP\"  No results for input(s): \"CPK\", \"CKMB\" in the last 72 hours.    Invalid input(s): \"TROIQ\"      Intake/Output Summary (Last 24 hours) at 12/8/2024 1132  Last data filed at 12/8/2024 0730  Gross per 24 hour   Intake 500 ml   Output 3850 ml   Net -3350 ml        Cardiographics    Telemetry: NSR  Tele reviewed has an episode of Afib overnight, now resolved     ECG: ST     Echocardiogram: Pending     CT Chest and abdomen 12/5/2024:  1. 2 mm obstructing calculus at the left ureterovesical junction, causing mild  left-sided hydronephrosis and hydroureter.  2. 1.1 cm nodule in the right lower lobe is increased since September 2018.       Assessment:       Principal Problem:    Ureteral colic  Resolved Problems:    * No resolved hospital problems. *       Plan:   AFIB RVR: likely secondary to sepsis  Now back in NSR  Treat underlying infection   Stop cardizem gtt transition to PO  Check ECHO  Check TSH   Monitor e- (goal K 4, Mg 2)  Patient on 81 mg ASA  Will need to add Eliquis 5 mg PO BID to reduce stroke risk. There is no definitive data to establish burden threshold duration of AF episodes for the initiation of OAC therapy b/c the Afib burden is likely to vary over time. We can further assess her Afib burden as outpatient. She will also need ischemic work-up with stress test when stable.     [id number] Score for Atrial Fibrillation Stroke Risk   Risk   Factors  Component Value   C CHF No 0   H HTN No 0   A2 Age >= 75 No,  (64 y.o.) 0   D DM No 0   S2 Prior Stroke/TIA No 0   V Vascular Disease No 0   A Age 65-74 Yes,  (64 y.o.) 1   Sc Sex

## 2024-12-09 ENCOUNTER — APPOINTMENT (OUTPATIENT)
Facility: HOSPITAL | Age: 64
DRG: 854 | End: 2024-12-09
Attending: STUDENT IN AN ORGANIZED HEALTH CARE EDUCATION/TRAINING PROGRAM
Payer: MEDICARE

## 2024-12-09 VITALS
RESPIRATION RATE: 24 BRPM | SYSTOLIC BLOOD PRESSURE: 108 MMHG | HEART RATE: 83 BPM | TEMPERATURE: 98.2 F | BODY MASS INDEX: 31.22 KG/M2 | HEIGHT: 71 IN | OXYGEN SATURATION: 95 % | DIASTOLIC BLOOD PRESSURE: 56 MMHG | WEIGHT: 223 LBS

## 2024-12-09 LAB
ALBUMIN SERPL-MCNC: 2.1 G/DL (ref 3.5–5)
ALBUMIN/GLOB SERPL: 0.6 (ref 1.1–2.2)
ALP SERPL-CCNC: 54 U/L (ref 45–117)
ALT SERPL-CCNC: 21 U/L (ref 12–78)
ANION GAP SERPL CALC-SCNC: 4 MMOL/L (ref 2–12)
AST SERPL-CCNC: 19 U/L (ref 15–37)
BASOPHILS # BLD: 0 K/UL (ref 0–0.1)
BASOPHILS NFR BLD: 0 % (ref 0–1)
BILIRUB SERPL-MCNC: 0.4 MG/DL (ref 0.2–1)
BUN SERPL-MCNC: 15 MG/DL (ref 6–20)
BUN/CREAT SERPL: 13 (ref 12–20)
CALCIUM SERPL-MCNC: 9 MG/DL (ref 8.5–10.1)
CHLORIDE SERPL-SCNC: 112 MMOL/L (ref 97–108)
CO2 SERPL-SCNC: 25 MMOL/L (ref 21–32)
CREAT SERPL-MCNC: 1.19 MG/DL (ref 0.55–1.02)
DIFFERENTIAL METHOD BLD: ABNORMAL
ECHO BSA: 2.25 M2
ECHO LA DIAMETER INDEX: 1.36 CM/M2
ECHO LA DIAMETER: 3 CM
ECHO LA VOL A-L A4C: 29 ML (ref 22–52)
ECHO LA VOL MOD A4C: 27 ML (ref 22–52)
ECHO LA VOLUME INDEX A-L A4C: 13 ML/M2 (ref 16–34)
ECHO LA VOLUME INDEX MOD A4C: 12 ML/M2 (ref 16–34)
ECHO LV EF PHYSICIAN: 60 %
ECHO LV FRACTIONAL SHORTENING: 26 % (ref 28–44)
ECHO LV INTERNAL DIMENSION DIASTOLE INDEX: 1.9 CM/M2
ECHO LV INTERNAL DIMENSION DIASTOLIC: 4.2 CM (ref 3.9–5.3)
ECHO LV INTERNAL DIMENSION SYSTOLIC INDEX: 1.4 CM/M2
ECHO LV INTERNAL DIMENSION SYSTOLIC: 3.1 CM
ECHO LV IVSD: 1.1 CM (ref 0.6–0.9)
ECHO LV MASS 2D: 147 G (ref 67–162)
ECHO LV MASS INDEX 2D: 66.5 G/M2 (ref 43–95)
ECHO LV POSTERIOR WALL DIASTOLIC: 1 CM (ref 0.6–0.9)
ECHO LV RELATIVE WALL THICKNESS RATIO: 0.48
ECHO LVOT AREA: 3.1 CM2
ECHO LVOT DIAM: 2 CM
ECHO MV MAX VELOCITY: 1 M/S
ECHO MV MEAN GRADIENT: 2 MMHG
ECHO MV MEAN VELOCITY: 0.7 M/S
ECHO MV PEAK GRADIENT: 4 MMHG
ECHO MV VTI: 25.3 CM
ECHO RA VOLUME: 39 ML
ECHO RA VOLUME: 41 ML
ECHO RV INTERNAL DIMENSION: 3.4 CM
EOSINOPHIL # BLD: 0.1 K/UL (ref 0–0.4)
EOSINOPHIL NFR BLD: 1 % (ref 0–7)
ERYTHROCYTE [DISTWIDTH] IN BLOOD BY AUTOMATED COUNT: 17 % (ref 11.5–14.5)
GLOBULIN SER CALC-MCNC: 3.8 G/DL (ref 2–4)
GLUCOSE SERPL-MCNC: 95 MG/DL (ref 65–100)
HCT VFR BLD AUTO: 32.7 % (ref 35–47)
HGB BLD-MCNC: 10.1 G/DL (ref 11.5–16)
IMM GRANULOCYTES # BLD AUTO: 0.1 K/UL (ref 0–0.04)
IMM GRANULOCYTES NFR BLD AUTO: 1 % (ref 0–0.5)
LYMPHOCYTES # BLD: 1.7 K/UL (ref 0.8–3.5)
LYMPHOCYTES NFR BLD: 17 % (ref 12–49)
MAGNESIUM SERPL-MCNC: 2 MG/DL (ref 1.6–2.4)
MCH RBC QN AUTO: 25.6 PG (ref 26–34)
MCHC RBC AUTO-ENTMCNC: 30.9 G/DL (ref 30–36.5)
MCV RBC AUTO: 83 FL (ref 80–99)
MONOCYTES # BLD: 0.8 K/UL (ref 0–1)
MONOCYTES NFR BLD: 8 % (ref 5–13)
NEUTS SEG # BLD: 7.3 K/UL (ref 1.8–8)
NEUTS SEG NFR BLD: 73 % (ref 32–75)
NRBC # BLD: 0 K/UL (ref 0–0.01)
NRBC BLD-RTO: 0 PER 100 WBC
PLATELET # BLD AUTO: 288 K/UL (ref 150–400)
PMV BLD AUTO: 9.9 FL (ref 8.9–12.9)
POTASSIUM SERPL-SCNC: 3.7 MMOL/L (ref 3.5–5.1)
PROT SERPL-MCNC: 5.9 G/DL (ref 6.4–8.2)
RBC # BLD AUTO: 3.94 M/UL (ref 3.8–5.2)
SODIUM SERPL-SCNC: 141 MMOL/L (ref 136–145)
TSH SERPL DL<=0.05 MIU/L-ACNC: 2.08 UIU/ML (ref 0.36–3.74)
WBC # BLD AUTO: 10 K/UL (ref 3.6–11)

## 2024-12-09 PROCEDURE — 6370000000 HC RX 637 (ALT 250 FOR IP): Performed by: STUDENT IN AN ORGANIZED HEALTH CARE EDUCATION/TRAINING PROGRAM

## 2024-12-09 PROCEDURE — 36415 COLL VENOUS BLD VENIPUNCTURE: CPT

## 2024-12-09 PROCEDURE — 6370000000 HC RX 637 (ALT 250 FOR IP): Performed by: NURSE PRACTITIONER

## 2024-12-09 PROCEDURE — 6360000002 HC RX W HCPCS: Performed by: STUDENT IN AN ORGANIZED HEALTH CARE EDUCATION/TRAINING PROGRAM

## 2024-12-09 PROCEDURE — 83735 ASSAY OF MAGNESIUM: CPT

## 2024-12-09 PROCEDURE — 84443 ASSAY THYROID STIM HORMONE: CPT

## 2024-12-09 PROCEDURE — 2580000003 HC RX 258: Performed by: STUDENT IN AN ORGANIZED HEALTH CARE EDUCATION/TRAINING PROGRAM

## 2024-12-09 PROCEDURE — 85025 COMPLETE CBC W/AUTO DIFF WBC: CPT

## 2024-12-09 PROCEDURE — 6370000000 HC RX 637 (ALT 250 FOR IP): Performed by: INTERNAL MEDICINE

## 2024-12-09 PROCEDURE — 51798 US URINE CAPACITY MEASURE: CPT

## 2024-12-09 PROCEDURE — 80053 COMPREHEN METABOLIC PANEL: CPT

## 2024-12-09 PROCEDURE — 93308 TTE F-UP OR LMTD: CPT

## 2024-12-09 RX ORDER — CIPROFLOXACIN 500 MG/1
500 TABLET, FILM COATED ORAL 2 TIMES DAILY
Qty: 14 TABLET | Refills: 0 | Status: SHIPPED | OUTPATIENT
Start: 2024-12-09 | End: 2024-12-18

## 2024-12-09 RX ORDER — DILTIAZEM HYDROCHLORIDE 180 MG/1
180 CAPSULE, COATED, EXTENDED RELEASE ORAL DAILY
Qty: 30 CAPSULE | Refills: 5 | Status: SHIPPED | OUTPATIENT
Start: 2024-12-10

## 2024-12-09 RX ORDER — NICOTINE 21 MG/24HR
1 PATCH, TRANSDERMAL 24 HOURS TRANSDERMAL DAILY
Qty: 30 PATCH | Refills: 3 | Status: SHIPPED | OUTPATIENT
Start: 2024-12-10

## 2024-12-09 RX ORDER — DILTIAZEM HYDROCHLORIDE 180 MG/1
180 CAPSULE, COATED, EXTENDED RELEASE ORAL DAILY
Status: DISCONTINUED | OUTPATIENT
Start: 2024-12-09 | End: 2024-12-09 | Stop reason: HOSPADM

## 2024-12-09 RX ORDER — OXYCODONE HYDROCHLORIDE 5 MG/1
5 TABLET ORAL EVERY 8 HOURS PRN
Qty: 6 TABLET | Refills: 0 | Status: SHIPPED | OUTPATIENT
Start: 2024-12-09 | End: 2024-12-12

## 2024-12-09 RX ADMIN — Medication 1 CAPSULE: at 08:26

## 2024-12-09 RX ADMIN — DILTIAZEM HYDROCHLORIDE 60 MG: 60 TABLET ORAL at 08:26

## 2024-12-09 RX ADMIN — DILTIAZEM HYDROCHLORIDE 180 MG: 180 CAPSULE, EXTENDED RELEASE ORAL at 11:36

## 2024-12-09 RX ADMIN — SODIUM CHLORIDE, PRESERVATIVE FREE 10 ML: 5 INJECTION INTRAVENOUS at 08:26

## 2024-12-09 RX ADMIN — ASPIRIN 81 MG: 81 TABLET, CHEWABLE ORAL at 08:26

## 2024-12-09 RX ADMIN — APIXABAN 5 MG: 5 TABLET, FILM COATED ORAL at 08:26

## 2024-12-09 RX ADMIN — ACETAMINOPHEN 650 MG: 325 TABLET ORAL at 05:53

## 2024-12-09 RX ADMIN — WATER 2000 MG: 1 INJECTION INTRAMUSCULAR; INTRAVENOUS; SUBCUTANEOUS at 11:36

## 2024-12-09 RX ADMIN — DILTIAZEM HYDROCHLORIDE 60 MG: 60 TABLET ORAL at 03:32

## 2024-12-09 ASSESSMENT — PAIN - FUNCTIONAL ASSESSMENT: PAIN_FUNCTIONAL_ASSESSMENT: ACTIVITIES ARE NOT PREVENTED

## 2024-12-09 ASSESSMENT — PAIN DESCRIPTION - LOCATION: LOCATION: HEAD

## 2024-12-09 ASSESSMENT — PAIN SCALES - GENERAL
PAINLEVEL_OUTOF10: 4
PAINLEVEL_OUTOF10: 0

## 2024-12-09 ASSESSMENT — PAIN DESCRIPTION - ORIENTATION: ORIENTATION: ANTERIOR

## 2024-12-09 ASSESSMENT — PAIN DESCRIPTION - DESCRIPTORS: DESCRIPTORS: ACHING

## 2024-12-09 NOTE — PLAN OF CARE
Problem: Discharge Planning  Goal: Discharge to home or other facility with appropriate resources  12/8/2024 2045 by Rosaline Nunez RN  Outcome: Progressing  Flowsheets (Taken 12/8/2024 2044)  Discharge to home or other facility with appropriate resources:   Identify barriers to discharge with patient and caregiver   Arrange for needed discharge resources and transportation as appropriate   Identify discharge learning needs (meds, wound care, etc)   Refer to discharge planning if patient needs post-hospital services based on physician order or complex needs related to functional status, cognitive ability or social support system  12/8/2024 1023 by Iliana Johnson RN  Outcome: Progressing  Flowsheets (Taken 12/8/2024 0730)  Discharge to home or other facility with appropriate resources: Identify barriers to discharge with patient and caregiver     Problem: Pain  Goal: Verbalizes/displays adequate comfort level or baseline comfort level  12/8/2024 2045 by Rosaline Nunez RN  Outcome: Progressing  Flowsheets (Taken 12/8/2024 2044)  Verbalizes/displays adequate comfort level or baseline comfort level:   Encourage patient to monitor pain and request assistance   Assess pain using appropriate pain scale   Administer analgesics based on type and severity of pain and evaluate response   Implement non-pharmacological measures as appropriate and evaluate response   Consider cultural and social influences on pain and pain management   Notify Licensed Independent Practitioner if interventions unsuccessful or patient reports new pain  12/8/2024 1023 by Iliana Johnson RN  Outcome: Progressing     Problem: Safety - Adult  Goal: Free from fall injury  12/8/2024 2045 by Rosaline Nunez, RN  Outcome: Progressing  12/8/2024 1023 by Iliana Johnson RN  Outcome: Progressing     Problem: ABCDS Injury Assessment  Goal: Absence of physical injury  12/8/2024 2045 by Rosaline Nunez RN  Outcome: Progressing  12/8/2024 1023 by

## 2024-12-09 NOTE — DISCHARGE SUMMARY
jaundice    Reviewed most current lab test results and cultures  YES  Reviewed most current radiology test results   YES  Review and summation of old records today    NO  Reviewed patient's current orders and MAR    YES  PMH/SH reviewed - no change compared to H&P    Procedures: see electronic medical records for all procedures/Xrays and details which were not copied into this note but were reviewed prior to creation of Plan.      LABS:  I reviewed today's most current labs and imaging studies.  Pertinent labs include:  Recent Labs     12/07/24  0614 12/08/24 0053 12/09/24  0642   WBC 24.7* 15.5* 10.0   HGB 10.0* 10.4* 10.1*   HCT 31.4* 33.6* 32.7*    272 288     Recent Labs     12/07/24  0614 12/08/24  0312 12/09/24  0642    142 141   K 4.1 3.7 3.7   * 114* 112*   CO2 22 22 25   GLUCOSE 138* 86 95   BUN 26* 21* 15   CREATININE 1.55* 1.27* 1.19*   CALCIUM 8.7 7.6* 9.0   MG  --  1.9 2.0   BILITOT  --   --  0.4   AST  --   --  19   ALT  --   --  21       Signed: Jovanny Pinzon Jr, MD

## 2024-12-09 NOTE — CARE COORDINATION
Care Management Initial Assessment       RUR:  12%   Readmission? No  1st IM letter given? Yes - 12/5/24  1st  letter given: No         12/06/24 4079   Service Assessment   Patient Orientation Alert and Oriented;Person;Place;Situation;Self   Cognition Alert   History Provided By Patient   Primary Caregiver Self   Support Systems Children   Patient's Healthcare Decision Maker is: Legal Next of Kin   PCP Verified by CM Yes  (Pt stated she goes to [facility] in [place] but cannot recall the provider's name.)   Last Visit to PCP   (\"it's been a while\")   Prior Functional Level Independent in ADLs/IADLs   Current Functional Level Independent in ADLs/IADLs   Can patient return to prior living arrangement Yes   Ability to make needs known: Good   Family able to assist with home care needs: Yes  (brother)   Would you like for me to discuss the discharge plan with any other family members/significant others, and if so, who? Yes  (son, Troy)   Financial Resources Medicare   Community Resources None   Social/Functional History   Lives With Other (comment)  (brother)   Type of Home House  (one floor with 3 manuel)   Home Layout One level   Home Equipment None   Active  Yes   Discharge Planning   Type of Residence House       CM spoke with pt via phone to introduce self/role, verify demographics and complete initial assessment.  Pt lives with her brother and plans on returning at d/c.  Pt has a supportive son.  Pt does not use any DME at baseline.  Pt is an active .  Pt denied a hx of HH, SNF or IPR.  Pt uses the [facility] in [place].  Pt's son will transport at d/c.  Pt shared she completed an ACD today and verified Full Code status.    Laura Sharma, NARCISA  Supervisee in Social Work  Care Management, [facility]  [phone]    
.    Quiana Howell, RN  Case Management  [phone]

## 2024-12-09 NOTE — PROGRESS NOTES
[facility]  [address] [place] [address] [phone]  [email or web address]  CARDIOLOGY PROGRESS NOTE    12/9/2024 9:57 AM    Admit Date: 12/5/2024    Admit Diagnosis:   Ureteral colic [N23]    Subjective:     Kendy Chinchilla   Patient in sinus rhythm.  She denies any chest pain, shortness of breath or palpitations.  Echo is planned to be done today  /61   Pulse 83   Temp 98.2 °F (36.8 °C) (Oral)   Resp 24   Ht 1.803 m (5' 11\")   Wt 101.3 kg (223 lb 5.2 oz)   SpO2 95%   BMI 31.15 kg/m²     Current Facility-Administered Medications   Medication Dose Route Frequency    lactobacillus (CULTURELLE) capsule 1 capsule  1 capsule Oral Daily    dilTIAZem (CARDIZEM) tablet 60 mg  60 mg Oral 4 times per day    apixaban (ELIQUIS) tablet 5 mg  5 mg Oral BID    melatonin tablet 6 mg  6 mg Oral Nightly PRN    simethicone (MYLICON) chewable tablet 80 mg  80 mg Oral Q6H PRN    polyethylene glycol (GLYCOLAX) packet 17 g  17 g Oral Once    cefTRIAXone (ROCEPHIN) 2,000 mg in sterile water 20 mL IV syringe  2,000 mg IntraVENous Q24H    oxyCODONE (ROXICODONE) immediate release tablet 5 mg  5 mg Oral Q4H PRN    HYDROmorphone HCl PF (DILAUDID) injection 1 mg  1 mg IntraVENous Q4H PRN    aspirin chewable tablet 81 mg  81 mg Oral Daily    ipratropium 0.5 mg-albuterol 2.5 mg (DUONEB) nebulizer solution 1 Dose  1 Dose Nebulization Q4H PRN    sodium chloride flush 0.9 % injection 5-40 mL  5-40 mL IntraVENous 2 times per day    sodium chloride flush 0.9 % injection 5-40 mL  5-40 mL IntraVENous PRN    0.9 % sodium chloride infusion   IntraVENous PRN    ondansetron (ZOFRAN-ODT) disintegrating tablet 4 mg  4 mg Oral Q8H PRN    Or    ondansetron (ZOFRAN) injection 4 mg  4 mg IntraVENous Q6H PRN    polyethylene glycol (GLYCOLAX) packet 17 g  17 g Oral Daily PRN    acetaminophen (TYLENOL) tablet 650 mg  650 mg Oral Q6H PRN    Or    acetaminophen (TYLENOL) suppository 650 mg  650 
      Hospitalist Progress Note    NAME:   Kendy Chinchilla   : 1960   MRN: [id number]     Date/Time: 2024 12:53 PM  Patient PCP: No primary care provider on file.    Estimated discharge date:   Barriers: Culture results, procal      Assessment / Plan:    Sepsis, tachycardia and leukocytosis with source below  2 mm obstructing calculus and left hydronephrosis  Bacteremia  CT with 2 mm obstructing calculus left ureterovesical junction causing mild left-sided hydronephrosis and hydroureter.     Continue IV ceftriaxone, dose increased secondary to bacteremia  Blood cultures with probable Proteus 2 of 2, urine culture with probable Proteus.  1 of 2 bottles with bacillus not in thoracic species-repeat cultures negative and patient did not receive vancomycin.  Discussed with pharmacy this is likely a contaminant.  Continue to follow cultures for susceptibility--pansensitive Proteus  Urology consulted, expertise appreciated-appreciate insights with regards to Raines maintenance  Oxycodone for moderate pain, Dilaudid for severe pain  Slight worsening of leukocytosis noted, continue to trend, a.m. procalcitonin     COPD  Tobacco use disorder  DuoNebs as needed  Nicotine     XENA versus CKD  Previous baseline unknown.  Creatinine on admission 2.66.  Trend labs-improving, follow off of IV fluid  Avoid nephrotoxic drugs     Abnormal imaging findings  1.1 cm nodule in right lower lobe is increased since 2018 when it was 8mm.  Given growth (defined >2mm) pulmonology recommended outpatient PET/CT and PFTs.    ACPs  Done with     Medical Decision Making:   I personally reviewed labs: CBC, BMP  I personally reviewed imaging:  I personally reviewed EKG:  Toxic drug monitoring:   Discussed case with: -discussed with patient gram-positive rods and repeat cultures.  Noted worsening leukocytosis, trend culture and WBC possible DC tomorrow or the day after.  A.m. procalcitonin.  Called lab for 
      Hospitalist Progress Note    NAME:   Kendy Chinchilla   : 1960   MRN: [id number]     Date/Time: 2024 2:00 PM  Patient PCP: No primary care provider on file.    Estimated discharge date: -  Barriers: Culture results      Assessment / Plan:    Sepsis, tachycardia and leukocytosis with source below  2 mm obstructing calculus and left hydronephrosis  Bacteremia  CT with 2 mm obstructing calculus left ureterovesical junction causing mild left-sided hydronephrosis and hydroureter.     Continue IV ceftriaxone, dose increased secondary to bacteremia  Blood cultures with probable Proteus 2 of 2, urine culture with probable Proteus.  Continue to follow cultures for susceptibility  Urology consulted, expertise appreciated-appreciate insights with regards to Raines maintenance  Oxycodone for moderate pain, Dilaudid for severe pain     COPD  Tobacco use disorder  DuoNebs as needed  Nicotine     XENA versus CKD  Previous baseline unknown.  Creatinine on admission 2.66.  Trend labs-improving, follow off of IV fluid  Avoid nephrotoxic drugs     Abnormal imaging findings  1.1 cm nodule in right lower lobe is increased since 2018 when it was 8mm.  Given growth (defined >2mm) added pulm consult for IP vs OP work up    ACPs  Done with     Medical Decision Making:   I personally reviewed labs: CBC, BMP  I personally reviewed imaging:  I personally reviewed EKG:  Toxic drug monitoring:   Discussed case with: -discussed with patient and  ACP documents and CPR/life preserving measures.  Discussed with pharmacy increasing dose of ceftriaxone based on bacteremia.      Code Status: FULL  DVT Prophylaxis: Lovenox  GI Prophylaxis:    Subjective:     Chief Complaint / Reason for Physician Visit  Patient seen and examined at bedside.  Circled back to discuss ACP documents after  came to discuss.  Patient would like to be brought back if her heart stops beating or she stops breathing.  
      Hospitalist Progress Note    NAME:   Kendy Chinchilla   : 1960   MRN: [id number]     Date/Time: 2024 2:30 PM    Patient PCP: No primary care provider on file.    Estimated discharge date:     Barriers: swain out, clinical improvement  Stable atrial fib      Assessment / Plan:    Sepsis POA WBC 22.9, temp 102.7, , RR 27, normal lactate   Acute left pyelonephritis with bacteremia Proteus mirabilis POA  2 mm obstructing calculus and left hydronephrosis s/p ureteral stent  CT with 2 mm obstructing calculus left ureterovesical junction causing mild left-sided hydronephrosis and hydroureter.  Septic in ED   mg% protein, > 100 WBC, 20-50 RBC, 3+ bacteria  Admit blood and urine culture grew pan sensitive proteus   Bacillus species in 1 bottle is contaminant  Continue IV ceftriaxone, dose increased secondary to bacteremia   Source control achieved   PO cipro at discharge  Can we get the swain out  Oxycodone for moderate pain, Dilaudid for severe pain  Leukocytosis and sepsis resolving  Swain removed this morning, but some hematuria this afternoon   Likely effective adding Eliquis for A-fib   Will make sure not retaining urine, just had a bladder scan of 600 cc    Paroxysmal atrial fib with RVR 2024  No prior history of atrial fibrillation  Rapid heart rate now resolved same day as onset, remained in NSR overnight  Never hypotensive or unstable  P.o. Cardizem CD, Eliquis started\   Outpatient follow-up  TSH 2.08    Echo LVEF 60 to 65%, normal wall motion   Left and right atrial size normal   Normal left ventricular size and function    No AS or MS    COPD  Tobacco use disorder  DuoNebs as needed  Nicotine     Acute kidney injury POA admit creat 2.66 --> 1.19  Creatinine 1.09 at [facility] in 2024  Creatinine on admission 2.66.  Trend labs-improving, follow off of IV fluid  Avoid nephrotoxic drugs  Serial labs     1.1 cm nodule right lower lobe increased in size over 6 years from 8 
4 Eyes Skin Assessment     NAME:  Kendy Chinchilla  YOB: 1960  MEDICAL RECORD NUMBER:  [id number]    The patient is being assessed for  Transfer to New Unit    I agree that at least one RN has performed a thorough Head to Toe Skin Assessment on the patient. ALL assessment sites listed below have been assessed.      Areas assessed by both nurses:    Head, Face, Ears, Shoulders, Back, Chest, Arms, Elbows, Hands, Sacrum. Buttock, Coccyx, Ischium, Legs. Feet and Heels, and Under Medical Devices         Does the Patient have a Wound? No noted wound(s)       Carmelo Prevention initiated by RN: Yes  Wound Care Orders initiated by RN: No    Pressure Injury (Stage 3,4, Unstageable, DTI, NWPT, and Complex wounds) if present, place Wound referral order by RN under : No    New Ostomies, if present place, Ostomy referral order under : No     Nurse 1 eSignature: Electronically signed by Rin Singh RN on 12/8/24 at 7:31 AM EST    **SHARE this note so that the co-signing nurse can place an eSignature**    Nurse 2 eSignature: Electronically signed by Iliana Johnson RN on 12/8/24 at 10:07 AM EST    
Called to obtain report, no answer.   
End of Shift Note    Bedside shift change report given to AMANDA Barrientos (oncoming nurse) by Rin Singh RN (offgoing nurse).  Report included the following information SBAR, Kardex, Intake/Output, MAR, Recent Results, Med Rec Status, Alarm Parameters , and Quality Measures    Shift worked:  Night Shift      Shift summary and any significant changes:    Patient arrived on PCU around 01:00 after RRT for A. Fib RVR. Patient was started on a Diltiazem drip and titrated to 15 mg/hr following an IV push. Despite the treatment, the heart rate remains sustained in the 150s. The patient's blood pressure has been steadily dropping. There has been no improvement in heart rate and BP continues to decrease. Provider is aware of the situation and has requested to continue the infusion.     5:50 AM: Orders were placed for a magnesium lab and a one-time IV infusion of calcium gluconate and Magnesium sulfate.     Concerns for physician to address:  HR     Zone phone for oncoming shift:   [phone]       Activity:  Level of Assistance: Independent  Number times ambulated in hallways past shift: 0  Number of times OOB to chair past shift: 0    Cardiac:   Cardiac Monitoring: Yes      Cardiac Rhythm: Sinus rhythm    Access:  Current line(s): PIV     Genitourinary:   Urinary Status: Raines, Draining    Respiratory:   O2 Device: Nasal cannula  Chronic home O2 use?: NO  Incentive spirometer at bedside: YES    GI:  Last BM (including prior to admit): 12/02/24  Current diet:  ADULT DIET; Regular  Passing flatus: YES    Pain Management:   Patient states pain is manageable on current regimen: YES    Skin:  Carmelo Scale Score: 21  Interventions: Wound Offloading (Prevention Methods): Repositioning    Patient Safety:  Fall Risk: Nursing Judgement-Fall Risk High(Add Comments): Yes  Fall Risk Interventions  Nursing Judgement-Fall Risk High(Add Comments): Yes  Toilet Every 2 Hours-In Advance of Need: Yes  Hourly Visual Checks: Awake, Quiet, In bed  Fall 
End of Shift Note    Bedside shift change report given to AMANDA Hdez (oncoming nurse) by Kenisha Tovar LPN (offgoing nurse).  Report included the following information SBAR    Shift worked:  7a-7p     Shift summary and any significant changes:    Pt rested in bed, up as tolerated. Ambulating in halls. Pt had some complaints of pain, prn pain meds given with positive effects. Pt tolerating diet. Pulmonary was consulted.     Concerns for physician to address:  none     Zone phone for oncoming shift:   [phone]           Kenisha Tovar LPN                            
End of Shift Note    Bedside shift change report given to AMANDA Quinteros (oncoming nurse) by Mahogany Almeida LPN (offgoing nurse).  Report included the following information SBAR, Intake/Output, MAR, and Recent Results    Shift worked:  3-p-7p     Shift summary and any significant changes:     Pt arrived to floor at 1500. Made comfortable and vitals obtained. Dual skin completed. IV fluids started. Nicotine patch applied. Pt NPO. Plan for OR tonight. Did have episode of chills and increased temp. Given tylenol PRN x 1. MD aware.     Concerns for physician to address:  none     Zone phone for oncoming shift:   [phone]       Activity:     Number times ambulated in hallways past shift: 0  Number of times OOB to chair past shift: 0    Cardiac:   Cardiac Monitoring: Yes           Access:  Current line(s): PIV     Genitourinary:        Respiratory:      Chronic home O2 use?: NO  Incentive spirometer at bedside: NO    GI:     Current diet:  Diet NPO  Passing flatus: YES    Pain Management:   Patient states pain is manageable on current regimen: YES    Skin:     Interventions:      Patient Safety:  Fall Risk:         Active Consults:   IP CONSULT TO UROLOGY  IP CONSULT TO SPIRITUAL CARE    Length of Stay:  Expected LOS: 3  Actual LOS: 0    Mahogany Almeida LPN                            
End of Shift Note    Bedside shift change report given to AMANDA Waggoner (oncoming nurse) by Iliana Johnson RN (offgoing nurse).  Report included the following information SBAR    Shift worked:  7A-7P     Shift summary and any significant changes:     Patient converted to NSR today. Dilt gtt off. Now on PO dilt q 6hrs. VSS, NSR, RA, Afebrile. BM today. Tylenol given for abd pain.     Concerns for physician to address:  Pain; AFIB control, Raines removal? D/C?     Zone phone for oncoming shift:   [phone]       Activity:  Level of Assistance: Independent  Number times ambulated in hallways past shift: 0  Number of times OOB to chair past shift: 4    Cardiac:   Cardiac Monitoring: Yes      Cardiac Rhythm: Sinus rhythm    Access:  Current line(s): PIV     Genitourinary:   Urinary Status: Draining, Raines    Respiratory:   O2 Device: Nasal cannula  Chronic home O2 use?: NO  Incentive spirometer at bedside: NO    GI:  Last BM (including prior to admit): 12/02/24  Current diet:  ADULT DIET; Regular  Passing flatus: YES    Pain Management:   Patient states pain is manageable on current regimen: NO    Skin:  Carmelo Scale Score: 21  Interventions: Wound Offloading (Prevention Methods): Repositioning    Patient Safety:  Fall Risk: Nursing Judgement-Fall Risk High(Add Comments): Yes  Fall Risk Interventions  Nursing Judgement-Fall Risk High(Add Comments): Yes  Toilet Every 2 Hours-In Advance of Need: Yes  Hourly Visual Checks: Awake, In chair  Fall Visual Posted: Armband, Socks  Room Door Open: Yes  Alarm On: Bed, Chair  Patient Moved Closer to Nursing Station: No    Active Consults:   IP CONSULT TO UROLOGY  IP CONSULT TO SPIRITUAL CARE  IP CONSULT TO PULMONOLOGY  IP CONSULT TO CARDIOLOGY    Length of Stay:  Expected LOS: 4  Actual LOS: 3    Iliana Johnson RN                            
End of Shift Note    Bedside shift change report given to AMANDA mckinney (oncoming nurse) by Kenisha Tovar LPN (offgoing nurse).  Report included the following information SBAR    Shift worked:  7a-7p     Shift summary and any significant changes:    Pt ambulated in halls and in room tolerated well. Pt has some complaints of gas pains, prn meds added. Raines draining and patent. Pt has been having gas, but still no BM noted.      Concerns for physician to address:  none     Zone phone for oncoming shift:   [phone]           Kenisha Tovar LPN                            
End of Shift Note    Bedside shift change report given to Kenisha (oncoming nurse) by Lemuel Salinas RN (offgoing nurse).  Report included the following information SBAR and MAR    Shift worked: 7pm - 7am     Shift summary and any significant changes:    Pain meds given PRN. Raines is patent, draining well. 2 sets of blood cultures done. Refused lovenox.     Concerns for physician to address: None      [facility]  [phone]       Activity:  Level of Assistance: Independent  Number times ambulated in hallways past shift: 0  Number of times OOB to chair past shift: 0    Cardiac:   Cardiac Monitoring: Yes      Cardiac Rhythm: Sinus rhythm    Access:  Current line(s): PIV     Genitourinary:   Urinary Status: Raines    Respiratory:   O2 Device: None (Room air)  Chronic home O2 use?: NO  Incentive spirometer at bedside:     GI:  Last BM (including prior to admit): 12/02/24  Current diet:  ADULT DIET; Regular  Passing flatus:     Pain Management:   Patient states pain is manageable on current regimen: YES    Skin:  Carmelo Scale Score: 21  Interventions: Wound Offloading (Prevention Methods): Bed, pressure redistribution/air    Patient Safety:  Fall Risk: Nursing Judgement-Fall Risk High(Add Comments): No  Fall Risk Interventions  Nursing Judgement-Fall Risk High(Add Comments): No  Toilet Every 2 Hours-In Advance of Need: No (Comment)  Hourly Visual Checks: Awake, In bed  Fall Visual Posted: Socks  Room Door Open: Deferred to promote rest  Alarm On: Bed  Patient Moved Closer to Nursing Station: No    Active Consults:   IP CONSULT TO UROLOGY  IP CONSULT TO SPIRITUAL CARE  IP CONSULT TO PULMONOLOGY    Length of Stay:  Expected LOS: 3  Actual LOS: 2    Lemuel Salinas RN                           
End of Shift Note    Bedside shift change report given to Kenisha (oncoming nurse) by Neli Montesinos RN (offgoing nurse).  Report included the following information MAR, Kardex, Recent Results    Shift worked:  7p-7a     Shift summary and any significant changes:     Patient admitted to unit, went to OR at night for cystoscopy with stent placement.  Swain in place draining. Dilaudid given for pain. Tmax 102.7. Tolerating po fluids.      Concerns for physician to address:  Gram negative rods in blood cultures     Zone phone for oncoming shift:   [phone]       Activity:     Number times ambulated in hallways past shift: 0  Number of times OOB to chair past shift: 0    Cardiac:   Cardiac Monitoring: Yes           Access:  Current line(s): PIV     Genitourinary:   Urinary status: swain    Respiratory:      Chronic home O2 use?: NO  Incentive spirometer at bedside: YES       GI:     Current diet:  ADULT DIET; Regular  Passing flatus: YES  Tolerating current diet: YES       Pain Management:   Patient states pain is manageable on current regimen: YES    Skin:     Interventions: float heels and increase time out of bed    Patient Safety:  Fall Score:    Interventions: gripper socks and pt to call before getting OOB       Length of Stay:  Expected LOS: 3  Actual LOS: 1      Neli Montesinos, RN                            
End of Shift Note    Bedside shift change report given to Иван PATEL (oncoming nurse) by Rosaline Nunez RN (offgoing nurse).  Report included the following information SBAR, Intake/Output, MAR, Recent Results, and Cardiac Rhythm NSR    Shift worked:  7p-7a     Shift summary and any significant changes:    Ambulated x1 c walker in corona  Urine is pink and clear, removed swain per orders at 0600  Tylenol given per PRN orders for pain       Concerns for physician to address:       Zone phone for oncoming shift:   [phone]       Activity:  Level of Assistance: Independent  Number times ambulated in hallways past shift: 1  Number of times OOB to chair past shift: 1    Cardiac:   Cardiac Monitoring: Yes      Cardiac Rhythm: Sinus rhythm    Access:  Current line(s): PIV     Genitourinary:   Urinary Status: Swain, Patent, Dysuria    Respiratory:   O2 Device: None (Room air)  Chronic home O2 use?: NO  Incentive spirometer at bedside: NO    GI:  Last BM (including prior to admit): 12/02/24  Current diet:  ADULT DIET; Regular  Passing flatus: YES    Pain Management:   Patient states pain is manageable on current regimen: YES    Skin:  Carmelo Scale Score: 22  Interventions: Wound Offloading (Prevention Methods): Bed, pressure reduction mattress, Blankets, Pillows, Repositioning    Patient Safety:  Fall Risk: Nursing Judgement-Fall Risk High(Add Comments): Yes  Fall Risk Interventions  Nursing Judgement-Fall Risk High(Add Comments): Yes  Toilet Every 2 Hours-In Advance of Need: No (Comment) (swain, up ad cierra)  Hourly Visual Checks: Awake, In bed  Fall Visual Posted: Socks, Armband  Room Door Open: Deferred to promote rest  Alarm On:  (up ad cierra)  Patient Moved Closer to Nursing Station: No    Active Consults:   IP CONSULT TO UROLOGY  IP CONSULT TO SPIRITUAL CARE  IP CONSULT TO PULMONOLOGY  IP CONSULT TO CARDIOLOGY    Length of Stay:  Expected LOS: 4  Actual LOS: 4    Rosaline Nunez, RN                            
Hospital follow-up PCP transitional care appointment has been scheduled with Dr. Obed Wyman on 12/24/24 at 0945. This is the first available appt due to limited provider availability. PCP office does not offer alternate provider option for hospital follow up. [facility] information AVS for patient resource. Pending patient discharge. Marina Ward, Care Management Assistant   
Nursing contacted Nocturnist/cross cover provider via non-urgent messaging system [employer] and notified patient having gas pains, took simethicone already, constipated, reported last bm 12/02 with no acute concerning features reported. . No other concerns reported. No acute distress reported. No other information provided by nurse. VSS.     Ordered miralax once po, tap water enema x1, stat kub -pending r/o acute findings. . Will defer further evaluation/management to the day shift primary attending care team. Patient denies any further complaints or concerns.     Nursing to notify Hospitalist for further/continued concerns. Will remain available overnight for further concerns if nursing/patient needs. Please note, there are RRT systems in this hospital in place that if nursing has acute or critical patient condition change or concern, this is to help facilitate and notify that patient needs immediate bedside evaluation by a provider.     Non-billable note.       
Predictive Model Details          33 (Caution)  Factor Value    Calculated 12/8/2024 10:04 34% Supplemental oxygen Nasal cannula    Deterioration Index Model 33% Age 64 years old     12% Respiratory rate 19     11% WBC count abnormal (15.5 K/uL)     3% BUN abnormal (21 MG/DL)     2% Sodium 142 mmol/L     2% Pulse 92     1% Potassium 3.7 mmol/L     1% Systolic 116     1% Hematocrit abnormal (33.6 %)     0% Pulse oximetry 97 %     0% Temperature 97.5 °F (36.4 °C)      10 AM: Cardiology consult paged.     10:24 AM: Patient converted to NSR, 70s-90s.     12:06 PM Family at bedside. Patient up in the bathroom, standby assist.     
RAPID RESPONSE NOTE:    BACKGROUND/ SITUATION:  64-year-old -American female admitted with sepsis secondary to UTI with 2 mm obstructing calculus and consequent left hydroureteronephrosis status post ureteral stenting found to be bacteremic with probable Proteus bacteremia.  Overnight patient became tachycardic to 140s prompting rapid response    FINDINGS:  Middle-age -American female, ill-appearing, in no apparent distress  Tachycardic rate, regular rhythm, no murmur rub gallop appreciated  CTAB  Heart rates 130s-140s  Blood pressure WNL    EKG performed at bedside and independently interpreted by myself demonstrates A-fib RVR    ASSESSMENT    Atrial fibrillation with rapid ventricular response  Sepsis secondary to bacteremia as consequence of obstructing pyelonephritis-Proteus    INTERVENTION/ RESPONSE  500 cc normal saline bolus now  Bolus diltiazem and started infusion  Transfer to stepdown unit  Obtain TTE  Cardiology consulted, greatly appreciate their expertise      Brad Babin DO  [facility] - Internal Medicine Hospitalist/ Nocturnist  12/8/2024 12:17 AM    Please do not hesitate to reach out to myself or assigned provider on-call via [employer] paging system with questions or concerns.     I have spent  35 minutes of critical care time involved in lab review, consultations with specialist, family decision- making, bedside attention and documentation. During this entire length of time I was immediately available to the patient .     Critical Care:  The reason for providing this level of medical care for this critically ill patient was due to a critical illness that impaired one or more vital organ systems, such that there was a high probability of imminent or life threatening deterioration in the patient's condition. This care involved high complexity decision making to assess, manipulate, and support vital system functions, to treat this degree of vital organ system failure, and to 
Spiritual Health History and Assessment/Progress Note  [facility]    Follow-up,  ,  ,      Name: Kendy Chinchilla MRN: [id number]    Age: 64 y.o.     Sex: female   Language: English   Hoahaoism: Quaker   Ureteral colic     Date: 12/6/2024            Total Time Calculated: 25 min              Spiritual Assessment continued in [facility] 3 SURG TELE        Referral/Consult From: Physician   Encounter Overview/Reason: Follow-up  Service Provided For: Patient and family together    Radha, Belief, Meaning:   Patient identifies as spiritual  Family/Friends are connected with a radha tradition or spiritual practice      Importance and Influence:  Patient has spiritual/personal beliefs that influence decisions regarding their health  Family/Friends have spiritual/personal beliefs that influence decisions regarding the patient's health    Community:  Patient is connected with a spiritual community  Family/Friends are connected with a spiritual community:    Assessment and Plan of Care:     Patient Interventions include: Facilitated expression of thoughts and feelings and Explored spiritual coping/struggle/distress Requested patient  to submit existing document for our records: Healthcare Power of /Advance Directive Appointment of Health Care Agent  Provided education on documents for clarity and greater understanding  Assisted in the completion of documents according to patient's wishes at this time      Family/Friends Interventions include: Other: They waited in room while I asked patient questions to complete the Advance Medical Directive.     Patient Plan of Care: No spiritual needs identified for follow-up  Family/Friends Plan of Care: No spiritual needs identified for follow-up    Electronically signed by Chaplain SHAI on 12/6/2024 at 12:28 PM   
labs: CBC, BMP  I personally reviewed imaging:  I personally reviewed EKG:  Toxic drug monitoring:   Discussed case with: Patient, family, nursing staff    Subjective:     Chief Complaint / Reason for Physician Visit  \" Can I get this catheter out soon\"  Asking about getting the Raines catheter out, urology mention it can be removed once sepsis resolved   I ordered a voiding trial and Raines removal in the morning  Atrial fibrillation with RVR overnight, now resolved   When I saw she was in normal sinus rhythm  No nausea vomiting, diarrhea on the antibiotics,   Still has some discomfort in the left abdomen but overall improved from admission   No chest pain or shortness of breath  Discussed with RN events overnight.       Objective:     VITALS:   Last 24hrs VS reviewed since prior progress note. Most recent are:  Patient Vitals for the past 24 hrs:   BP Temp Temp src Pulse Resp SpO2   12/08/24 1445 122/81 97.9 °F (36.6 °C) Oral 79 29 98 %   12/08/24 1400 -- -- Oral 77 -- --   12/08/24 1100 (!) 116/59 98.1 °F (36.7 °C) Oral 74 23 98 %   12/08/24 1030 -- -- -- 78 23 --   12/08/24 1000 103/65 -- -- 93 25 --   12/08/24 0910 116/72 -- -- 92 19 --   12/08/24 0800 (!) 96/59 -- -- 94 26 97 %   12/08/24 0730 96/63 97.5 °F (36.4 °C) Oral (!) 110 23 96 %   12/08/24 0346 99/71 -- -- (!) 153 22 93 %   12/08/24 0331 111/72 -- -- (!) 153 23 92 %   12/08/24 0316 102/81 -- -- (!) 152 24 91 %   12/08/24 0231 -- 98.2 °F (36.8 °C) Oral -- -- --   12/08/24 0137 128/74 98.3 °F (36.8 °C) Oral (!) 146 22 92 %   12/07/24 2340 (!) 105/51 99.5 °F (37.5 °C) Oral 85 17 98 %   12/07/24 1959 (!) 121/59 98.1 °F (36.7 °C) Oral 86 17 94 %   12/07/24 1637 114/62 98.1 °F (36.7 °C) Oral 87 16 91 %         Intake/Output Summary (Last 24 hours) at 12/8/2024 1535  Last data filed at 12/8/2024 1254  Gross per 24 hour   Intake 1500 ml   Output 4000 ml   Net -2500 ml        I had a face to face encounter and independently examined this patient on 12/8/2024, as 
0.0 0.0 - 0.1 K/UL    Immature Granulocytes Absolute 0.2 (H) 0.00 - 0.04 K/UL    Differential Type SMEAR SCANNED      RBC Comment ANISOCYTOSIS  1+           Imaging:  CT CHEST ABDOMEN PELVIS WO CONTRAST Additional Contrast? 1   Final Result      1. 2 mm obstructing calculus at the left ureterovesical junction, causing mild   left-sided hydronephrosis and hydroureter.   2. 1.1 cm nodule in the right lower lobe is increased since September 2018.         Electronically signed by SAROJ ELIZABETH [employer]IST [employer]    (Results Pending)       Signed By: MARKELL Tovar - December 6, 2024

## 2024-12-09 NOTE — DISCHARGE INSTRUCTIONS
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 that then got into the bloodstream     IV antibiotics in the hospital will complete 9 more days of ciprofloxacin at home to complete treatment    Need to follow-up with urology group to get the stone and the stent removed in the next few weeks      You developed a heart rhythm problem called paroxysmal atrial fibrillation in the hospital   It resolved and you have a normal heart rhythm at discharge   Unclear if this was a single episode related to being sick or if you had prior episodes and were unaware     Important to follow-up with cardiology so they can monitor this     Cardizem CD or diltiazem CD is a medication to help slow the heart rate and control the atrial fibrillation     Atrial fibrillation is a risk for stroke, they started you on a oral blood thinner called Eliquis    Continue the Eliquis or apixaban until stop by the cardiology Doctor    Eliquis or apixaban is a blood thinner     Works by reducing clotting of blood and subsequently reducing the risk of stroke related to the atrial fibrillation    Taking for Atrial fibrillation for stroke prevention    Small but increased risk of bleeding on the blood thinner vs. Not taking the blood thinner   Vast majority of patients taking the drug will not have bleeding complications   Bleeding risk is lower than the risk of stroke     Serious or unusual bleeding are less common, but needs to be evaluated in the emergency room immediately   Uncontrolled heavy nose bleeds  Having persistent  bloody urine   Vomiting blood or black coffee-ground material  Having bloody stools(more than streaks of blood on toilet paper with wiping)   Black, loose stools suggesting partially digested blood, a sign of bleeding   Cuts or wounds that will not bleeding with prolonged pressure     Severe head trauma should also

## 2024-12-09 NOTE — FLOWSHEET NOTE
Pt being discharged home with family. Pt IV removed. Telemetry removed. Pt dressed all belongings packed up. Discharge instructions reviewed with pt. Pt aware of all follow up appointments. All questions answered and clarified. Pt taken down to main lobby via wheelchair.

## 2024-12-10 LAB
EKG DIAGNOSIS: NORMAL
EKG Q-T INTERVAL: 258 MS
EKG QRS DURATION: 92 MS
EKG QTC CALCULATION (BAZETT): 395 MS
EKG R AXIS: 80 DEGREES
EKG T AXIS: -59 DEGREES
EKG VENTRICULAR RATE: 141 BPM

## 2024-12-12 LAB
BACTERIA SPEC CULT: NORMAL
BACTERIA SPEC CULT: NORMAL
SERVICE CMNT-IMP: NORMAL
SERVICE CMNT-IMP: NORMAL

## 2025-01-13 ENCOUNTER — APPOINTMENT (OUTPATIENT)
Dept: URBAN - METROPOLITAN AREA CLINIC 213 | Age: 65
Setting detail: DERMATOLOGY
End: 2025-01-14

## 2025-01-13 DIAGNOSIS — D22 MELANOCYTIC NEVI: ICD-10-CM

## 2025-01-13 DIAGNOSIS — Z87.2 PERSONAL HISTORY OF DISEASES OF THE SKIN AND SUBCUTANEOUS TISSUE: ICD-10-CM

## 2025-01-13 DIAGNOSIS — L82.0 INFLAMED SEBORRHEIC KERATOSIS: ICD-10-CM

## 2025-01-13 DIAGNOSIS — L81.4 OTHER MELANIN HYPERPIGMENTATION: ICD-10-CM

## 2025-01-13 DIAGNOSIS — D18.0 HEMANGIOMA: ICD-10-CM

## 2025-01-13 DIAGNOSIS — Z85.828 PERSONAL HISTORY OF OTHER MALIGNANT NEOPLASM OF SKIN: ICD-10-CM

## 2025-01-13 DIAGNOSIS — L82.1 OTHER SEBORRHEIC KERATOSIS: ICD-10-CM

## 2025-01-13 PROBLEM — D22.71 MELANOCYTIC NEVI OF RIGHT LOWER LIMB, INCLUDING HIP: Status: ACTIVE | Noted: 2025-01-13

## 2025-01-13 PROBLEM — D18.01 HEMANGIOMA OF SKIN AND SUBCUTANEOUS TISSUE: Status: ACTIVE | Noted: 2025-01-13

## 2025-01-13 PROBLEM — D22.5 MELANOCYTIC NEVI OF TRUNK: Status: ACTIVE | Noted: 2025-01-13

## 2025-01-13 PROCEDURE — OTHER COUNSELING: OTHER

## 2025-01-13 PROCEDURE — OTHER MIPS QUALITY: OTHER

## 2025-01-13 PROCEDURE — OTHER LIQUID NITROGEN: OTHER

## 2025-01-13 PROCEDURE — 17110 DESTRUCT B9 LESION 1-14: CPT

## 2025-01-13 PROCEDURE — 99213 OFFICE O/P EST LOW 20 MIN: CPT | Mod: 25

## 2025-01-13 ASSESSMENT — LOCATION SIMPLE DESCRIPTION DERM
LOCATION SIMPLE: CHEST
LOCATION SIMPLE: LEFT UPPER BACK
LOCATION SIMPLE: RIGHT UPPER BACK
LOCATION SIMPLE: LEFT FOREARM
LOCATION SIMPLE: RIGHT THIGH
LOCATION SIMPLE: LEFT POSTERIOR UPPER ARM

## 2025-01-13 ASSESSMENT — LOCATION DETAILED DESCRIPTION DERM
LOCATION DETAILED: LEFT PROXIMAL POSTERIOR UPPER ARM
LOCATION DETAILED: LEFT DISTAL DORSAL FOREARM
LOCATION DETAILED: RIGHT ANTERIOR PROXIMAL THIGH
LOCATION DETAILED: RIGHT SUPERIOR UPPER BACK
LOCATION DETAILED: LEFT MEDIAL SUPERIOR CHEST
LOCATION DETAILED: LEFT SUPERIOR UPPER BACK
LOCATION DETAILED: STERNUM

## 2025-01-13 ASSESSMENT — LOCATION ZONE DERM
LOCATION ZONE: LEG
LOCATION ZONE: ARM
LOCATION ZONE: TRUNK

## 2025-01-13 NOTE — HPI: PREVENTATIVE SKIN CHECK
What Is The Reason For Today's Visit?: Full Body Skin Examination
Alert and oriented, no focal deficits, no motor or sensory deficits.

## 2025-01-13 NOTE — PROCEDURE: LIQUID NITROGEN
Spray Paint Text: The liquid nitrogen was applied to the skin utilizing a spray paint frosting technique.
Post-Care Instructions: I reviewed with the patient in detail post-care instructions. Patient is to wear sunprotection, and avoid picking at any of the treated lesions. Pt may apply Vaseline to crusted or scabbing areas.
Detail Level: Detailed
Render Note In Bullet Format When Appropriate: No
Show Aperture Variable?: Yes
Medical Necessity Clause: This procedure was medically necessary because the lesions that were treated were:
Consent: The patient's consent was obtained including but not limited to risks of crusting, scabbing, blistering, scarring, darker or lighter pigmentary change, recurrence, incomplete removal and infection.
Medical Necessity Information: It is in your best interest to select a reason for this procedure from the list below. All of these items fulfill various CMS LCD requirements except the new and changing color options.

## 2025-08-01 ENCOUNTER — TRANSCRIBE ORDERS (OUTPATIENT)
Facility: HOSPITAL | Age: 65
End: 2025-08-01

## 2025-08-01 DIAGNOSIS — R93.89 ABNORMAL RADIOLOGICAL FINDINGS IN SKIN AND SUBCUTANEOUS TISSUE: Primary | ICD-10-CM

## 2025-09-04 ENCOUNTER — HOSPITAL ENCOUNTER (EMERGENCY)
Facility: HOSPITAL | Age: 65
Discharge: HOME | End: 2025-09-04
Payer: MEDICARE

## 2025-09-04 ENCOUNTER — APPOINTMENT (OUTPATIENT)
Facility: HOSPITAL | Age: 65
End: 2025-09-04
Payer: MEDICARE

## 2025-09-04 VITALS
WEIGHT: 214.07 LBS | SYSTOLIC BLOOD PRESSURE: 130 MMHG | HEART RATE: 87 BPM | OXYGEN SATURATION: 97 % | TEMPERATURE: 98.4 F | RESPIRATION RATE: 20 BRPM | BODY MASS INDEX: 29.86 KG/M2 | DIASTOLIC BLOOD PRESSURE: 70 MMHG

## 2025-09-04 DIAGNOSIS — M25.512 CHRONIC LEFT SHOULDER PAIN: Primary | ICD-10-CM

## 2025-09-04 DIAGNOSIS — G89.29 CHRONIC LEFT SHOULDER PAIN: Primary | ICD-10-CM

## 2025-09-04 DIAGNOSIS — G56.82 SUPRASCAPULAR ENTRAPMENT NEUROPATHY OF LEFT SIDE: ICD-10-CM

## 2025-09-04 PROCEDURE — 6360000002 HC RX W HCPCS

## 2025-09-04 PROCEDURE — 73030 X-RAY EXAM OF SHOULDER: CPT

## 2025-09-04 RX ORDER — OXYCODONE AND ACETAMINOPHEN 5; 325 MG/1; MG/1
1 TABLET ORAL EVERY 6 HOURS PRN
Qty: 12 TABLET | Refills: 0 | Status: SHIPPED | OUTPATIENT
Start: 2025-09-04 | End: 2025-09-07

## 2025-09-04 RX ORDER — KETOROLAC TROMETHAMINE 30 MG/ML
30 INJECTION, SOLUTION INTRAMUSCULAR; INTRAVENOUS
Status: COMPLETED | OUTPATIENT
Start: 2025-09-04 | End: 2025-09-04

## 2025-09-04 RX ORDER — PREDNISONE 20 MG/1
20 TABLET ORAL DAILY
Qty: 5 TABLET | Refills: 0 | Status: SHIPPED | OUTPATIENT
Start: 2025-09-04 | End: 2025-09-09

## 2025-09-04 RX ADMIN — KETOROLAC TROMETHAMINE 30 MG: 30 INJECTION, SOLUTION INTRAMUSCULAR at 18:28

## 2025-09-04 ASSESSMENT — PAIN DESCRIPTION - LOCATION: LOCATION: SHOULDER

## 2025-09-04 ASSESSMENT — PAIN DESCRIPTION - ORIENTATION: ORIENTATION: LEFT

## 2025-09-04 ASSESSMENT — PAIN SCALES - GENERAL: PAINLEVEL_OUTOF10: 10

## (undated) DEVICE — SOLUTION IRRIGATION STRL H2O 1000 ML UROMATIC CONTAINER

## (undated) DEVICE — OPEN-END URETERAL CATHETER: Brand: COOK

## (undated) DEVICE — 1LYRTR 16FR10ML100%SIL UMS SNP: Brand: MEDLINE INDUSTRIES, INC.

## (undated) DEVICE — SPONGE GZ W4XL4IN COT 12 PLY TYP VII WVN C FLD DSGN STERILE

## (undated) DEVICE — SOLUTION IRRIG 1000ML STRL H2O USP PLAS POUR BTL

## (undated) DEVICE — GUIDEWIRE ENDOSCP L150CM DIA0.035IN TIP L15CM BENT PTFE

## (undated) DEVICE — GOWN,SIRUS,FABRNF,XL,20/CS: Brand: MEDLINE

## (undated) DEVICE — CYSTO MRMC: Brand: MEDLINE INDUSTRIES, INC.

## (undated) DEVICE — CYSTO/BLADDER IRRIGATION SET, REGULATING CLAMP